# Patient Record
Sex: MALE | URBAN - METROPOLITAN AREA
[De-identification: names, ages, dates, MRNs, and addresses within clinical notes are randomized per-mention and may not be internally consistent; named-entity substitution may affect disease eponyms.]

---

## 2018-10-08 ENCOUNTER — APPOINTMENT (RX ONLY)
Dept: URBAN - METROPOLITAN AREA CLINIC 4 | Facility: CLINIC | Age: 83
Setting detail: DERMATOLOGY
End: 2018-10-08

## 2018-10-08 DIAGNOSIS — L82.1 OTHER SEBORRHEIC KERATOSIS: ICD-10-CM

## 2018-10-08 DIAGNOSIS — L81.4 OTHER MELANIN HYPERPIGMENTATION: ICD-10-CM

## 2018-10-08 DIAGNOSIS — D22 MELANOCYTIC NEVI: ICD-10-CM

## 2018-10-08 DIAGNOSIS — L57.0 ACTINIC KERATOSIS: ICD-10-CM

## 2018-10-08 PROBLEM — D22.5 MELANOCYTIC NEVI OF TRUNK: Status: ACTIVE | Noted: 2018-10-08

## 2018-10-08 PROCEDURE — 17000 DESTRUCT PREMALG LESION: CPT

## 2018-10-08 PROCEDURE — ? LIQUID NITROGEN

## 2018-10-08 PROCEDURE — ? COUNSELING

## 2018-10-08 PROCEDURE — 17003 DESTRUCT PREMALG LES 2-14: CPT

## 2018-10-08 PROCEDURE — 99214 OFFICE O/P EST MOD 30 MIN: CPT | Mod: 25

## 2018-10-08 ASSESSMENT — LOCATION ZONE DERM
LOCATION ZONE: EAR
LOCATION ZONE: NOSE
LOCATION ZONE: FACE
LOCATION ZONE: TRUNK

## 2018-10-08 ASSESSMENT — LOCATION SIMPLE DESCRIPTION DERM
LOCATION SIMPLE: NOSE
LOCATION SIMPLE: RIGHT NOSE
LOCATION SIMPLE: LEFT CHEEK
LOCATION SIMPLE: LEFT NOSE
LOCATION SIMPLE: LEFT UPPER BACK
LOCATION SIMPLE: LEFT EAR

## 2018-10-08 ASSESSMENT — LOCATION DETAILED DESCRIPTION DERM
LOCATION DETAILED: LEFT SOFT TRIANGLE OF THE NOSE
LOCATION DETAILED: LEFT LATERAL UPPER BACK
LOCATION DETAILED: NASAL ROOT
LOCATION DETAILED: LEFT NASAL SIDEWALL
LOCATION DETAILED: LEFT SUPERIOR UPPER BACK
LOCATION DETAILED: LEFT SCAPHA
LOCATION DETAILED: LEFT INFERIOR LATERAL UPPER BACK
LOCATION DETAILED: RIGHT NASAL SIDEWALL
LOCATION DETAILED: LEFT NASAL DORSUM
LOCATION DETAILED: LEFT SUPERIOR CENTRAL MALAR CHEEK

## 2018-10-08 NOTE — PROCEDURE: MIPS QUALITY
Quality 131: Pain Assessment And Follow-Up: Pain assessment NOT documented as being performed, documentation the patient is not eligible for a pain assessment using a standardized tool
Quality 111:Pneumonia Vaccination Status For Older Adults: Pneumococcal Vaccination Previously Received
Detail Level: Detailed
Quality 110: Preventive Care And Screening: Influenza Immunization: Influenza Immunization previously received during influenza season

## 2019-03-21 ENCOUNTER — APPOINTMENT (RX ONLY)
Dept: URBAN - METROPOLITAN AREA CLINIC 4 | Facility: CLINIC | Age: 84
Setting detail: DERMATOLOGY
End: 2019-03-21

## 2019-03-21 DIAGNOSIS — Z87.2 PERSONAL HISTORY OF DISEASES OF THE SKIN AND SUBCUTANEOUS TISSUE: ICD-10-CM

## 2019-03-21 DIAGNOSIS — D22 MELANOCYTIC NEVI: ICD-10-CM

## 2019-03-21 DIAGNOSIS — L81.4 OTHER MELANIN HYPERPIGMENTATION: ICD-10-CM

## 2019-03-21 DIAGNOSIS — L82.1 OTHER SEBORRHEIC KERATOSIS: ICD-10-CM

## 2019-03-21 DIAGNOSIS — L57.0 ACTINIC KERATOSIS: ICD-10-CM

## 2019-03-21 DIAGNOSIS — D18.0 HEMANGIOMA: ICD-10-CM

## 2019-03-21 PROBLEM — D22.5 MELANOCYTIC NEVI OF TRUNK: Status: ACTIVE | Noted: 2019-03-21

## 2019-03-21 PROBLEM — D18.01 HEMANGIOMA OF SKIN AND SUBCUTANEOUS TISSUE: Status: ACTIVE | Noted: 2019-03-21

## 2019-03-21 PROCEDURE — ? DIAGNOSIS COMMENT

## 2019-03-21 PROCEDURE — 17003 DESTRUCT PREMALG LES 2-14: CPT

## 2019-03-21 PROCEDURE — 99213 OFFICE O/P EST LOW 20 MIN: CPT | Mod: 25

## 2019-03-21 PROCEDURE — ? LIQUID NITROGEN

## 2019-03-21 PROCEDURE — ? COUNSELING

## 2019-03-21 PROCEDURE — 17000 DESTRUCT PREMALG LESION: CPT

## 2019-03-21 ASSESSMENT — LOCATION DETAILED DESCRIPTION DERM
LOCATION DETAILED: LEFT SUPERIOR MEDIAL MALAR CHEEK
LOCATION DETAILED: LEFT RADIAL DORSAL HAND
LOCATION DETAILED: NASAL DORSUM
LOCATION DETAILED: LEFT INFERIOR UPPER BACK
LOCATION DETAILED: LEFT POSTERIOR SHOULDER
LOCATION DETAILED: RIGHT ULNAR DORSAL HAND
LOCATION DETAILED: RIGHT INFERIOR MEDIAL UPPER BACK
LOCATION DETAILED: LEFT SCAPHA
LOCATION DETAILED: RIGHT RADIAL DORSAL HAND
LOCATION DETAILED: RIGHT SUPERIOR LATERAL BUCCAL CHEEK
LOCATION DETAILED: INFERIOR THORACIC SPINE
LOCATION DETAILED: LEFT SUPERIOR POSTERIOR HELIX
LOCATION DETAILED: RIGHT POSTERIOR SHOULDER

## 2019-03-21 ASSESSMENT — LOCATION ZONE DERM
LOCATION ZONE: FACE
LOCATION ZONE: TRUNK
LOCATION ZONE: NOSE
LOCATION ZONE: EAR
LOCATION ZONE: ARM
LOCATION ZONE: HAND

## 2019-03-21 ASSESSMENT — LOCATION SIMPLE DESCRIPTION DERM
LOCATION SIMPLE: RIGHT SHOULDER
LOCATION SIMPLE: LEFT HAND
LOCATION SIMPLE: RIGHT CHEEK
LOCATION SIMPLE: RIGHT HAND
LOCATION SIMPLE: LEFT CHEEK
LOCATION SIMPLE: LEFT SHOULDER
LOCATION SIMPLE: LEFT EAR
LOCATION SIMPLE: UPPER BACK
LOCATION SIMPLE: LEFT UPPER BACK
LOCATION SIMPLE: NOSE
LOCATION SIMPLE: RIGHT UPPER BACK

## 2020-12-02 PROBLEM — Z95.2 S/P AVR (AORTIC VALVE REPLACEMENT): Status: ACTIVE | Noted: 2020-12-02

## 2020-12-02 PROBLEM — Z98.890 S/P MVR (MITRAL VALVE REPAIR): Status: ACTIVE | Noted: 2020-12-02

## 2020-12-03 NOTE — H&P (VIEW-ONLY)
Pawan Hernaneds MD, [address] [facility] [address], [address] [place] Phone [phone]   Fax [phone] Date of visit: 12/3/2020 Primary/Requesting provider: Rupert Gunn MD 
 
Chief Complaint Patient presents with  New Patient [facility]  
   
  
HISTORY OF PRESENT ILLNESS Tonya Shi is a 80 y.o. male. HPI Patient is a 80 y.o. male who presents for evaluation of a hernia. This was incidentally noted in the shower about 3 weeks ago. He does recall eating out and vomiting as a result the night prior to identifying the hernia but did not have any pain in the groin. He continues to be asymptomatic, denying pain, nausea, vomiting, alteration of baseline bowel or bladder function. He also denies any concerns on the right side. He is traveling to [facility] in mid January. Interestingly, he has a brother and a son who are PCPs and his brother said to not have surgery, son encouraged him to fix it. He is very active. Medications:  
Current Outpatient Medications Medication Sig  Synthroid 100 mcg tablet Take 100 mcg by mouth daily.  rosuvastatin (CRESTOR) 10 mg tablet Take 10 mg by mouth daily.  cholecalciferol, vitamin D3, (VITAMIN D3 PO) Take  by mouth.  aspirin delayed-release 81 mg tablet Take  by mouth daily.  omeprazole (PRILOSEC) 20 mg capsule Take 20 mg by mouth daily. Prn No current facility-administered medications for this visit. Allergies: No Known Allergies Past History: 
Past Medical History:  
Diagnosis Date  GERD (gastroesophageal reflux disease) Kelly's Esophagus  Hypercholesterolemia  Hypothyroidism Dx approx 2015 Past Surgical History:  
Procedure Laterality Date  HX AORTIC VALVE REPLACEMENT  2005  HX COLONOSCOPY  2017  HX HEMORRHOIDECTOMY Family and Social History: 
Family History Problem Relation Age of Onset  Heart Disease Brother  Diabetes Daughter  Cancer Brother   
     prostate  No Known Problems Brother Social History Socioeconomic History  Marital status:  Spouse name: Not on file  Number of children: Not on file  Years of education: Not on file  Highest education level: Not on file Occupational History  Not on file Social Needs  Financial resource strain: Not on file  Food insecurity Worry: Not on file Inability: Not on file  Transportation needs Medical: Not on file Non-medical: Not on file Tobacco Use  Smoking status: Never Smoker  Smokeless tobacco: Never Used Substance and Sexual Activity  Alcohol use: Yes Comment: 4-5 drinks/week  Drug use: Never  Sexual activity: Not on file Lifestyle  Physical activity Days per week: Not on file Minutes per session: Not on file  Stress: Not on file Relationships  Social connections Talks on phone: Not on file Gets together: Not on file Attends Orthodox service: Not on file Active member of club or organization: Not on file Attends meetings of clubs or organizations: Not on file Relationship status: Not on file  Intimate partner violence Fear of current or ex partner: Not on file Emotionally abused: Not on file Physically abused: Not on file Forced sexual activity: Not on file Other Topics Concern  Not on file Social History Narrative  Not on file Review of Systems Constitutional: Negative. HENT: Negative. Eyes: Negative. Respiratory: Negative. Cardiovascular: Negative. Gastrointestinal: Negative. Genitourinary: Negative. Musculoskeletal: Negative. Skin:  
     Left inguinal hernia Neurological: Negative. Endo/Heme/Allergies: Negative. Psychiatric/Behavioral: Negative. Physical Exam 
Vitals signs and nursing note reviewed. Constitutional: Appearance: He is well-developed. He is not toxic-appearing or diaphoretic. HENT:  
   Head: Normocephalic and atraumatic. Eyes:  
   General: No scleral icterus. Conjunctiva/sclera: Conjunctivae normal.  
   Pupils: Pupils are equal, round, and reactive to light. Neck: Musculoskeletal: Normal range of motion. Thyroid: No thyromegaly. Vascular: No JVD. Trachea: No tracheal deviation. Cardiovascular:  
   Rate and Rhythm: Normal rate and regular rhythm. Heart sounds: No murmur. No friction rub. No gallop. Pulmonary:  
   Effort: Pulmonary effort is normal. No respiratory distress. Breath sounds: Normal breath sounds. No wheezing or rales. Abdominal:  
   General: There is no distension. Palpations: Abdomen is soft. There is no mass. Hernia: A hernia is present. Hernia is present in the left inguinal area. There is no hernia in the right inguinal area. Genitourinary: 
   Penis: Normal.   
   Scrotum/Testes: Normal.  
   Epididymis:  
   Right: Normal.  
   Left: Normal.  
Musculoskeletal:  
   Comments: No gross deformities Skin: 
   General: Skin is warm. Neurological:  
   General: No focal deficit present. Mental Status: He is alert and oriented to person, place, and time. Cranial Nerves: No cranial nerve deficit. Motor: No weakness. Gait: Gait normal.  
Psychiatric:     
   Mood and Affect: Mood normal.     
   Behavior: Behavior normal. Behavior is cooperative. Thought Content: Thought content normal.     
   Judgment: Judgment normal.  
 
 
 
ASSESSMENT and PLAN Encounter Diagnoses Name Primary?  Left inguinal hernia Yes Discussed risk of incarceration/strangulation event in inguinal hernias, at least those identified incidentally which may not apply here, as 1% annual risk at 5 years in recent study randomizing men to surgery or observation. The same study indicated a >60% incidence of elective conversion from observation arm to hernia repair due to symptoms by 10 years. Based on above, and in consideration of his advanced age but overall excellent health and active lifestyle, I recommend repairing the hernia now, so as to avoid any future symptom-related decrease in activity. After discussion, he agrees. Will proceed with left inguinal hernia repair with mesh. Technical details of the procedure are reviewed. Risks reviewed include risks of anesthesia, bleeding, infection, visceral injury, persistent post-operative pain, and hernia recurrence. All questions are answered.

## 2020-12-16 ENCOUNTER — ANESTHESIA EVENT (OUTPATIENT)
Dept: SURGERY | Age: 85
End: 2020-12-16
Payer: MEDICARE

## 2020-12-17 ENCOUNTER — HOSPITAL ENCOUNTER (OUTPATIENT)
Age: 85
Setting detail: OUTPATIENT SURGERY
Discharge: HOME | End: 2020-12-17
Attending: SURGERY | Admitting: SURGERY
Payer: MEDICARE

## 2020-12-17 ENCOUNTER — ANESTHESIA (OUTPATIENT)
Dept: SURGERY | Age: 85
End: 2020-12-17
Payer: MEDICARE

## 2020-12-17 VITALS
HEART RATE: 68 BPM | WEIGHT: 188 LBS | TEMPERATURE: 98 F | DIASTOLIC BLOOD PRESSURE: 62 MMHG | RESPIRATION RATE: 18 BRPM | SYSTOLIC BLOOD PRESSURE: 127 MMHG | OXYGEN SATURATION: 97 % | BODY MASS INDEX: 26.22 KG/M2

## 2020-12-17 DIAGNOSIS — K40.90 LEFT INGUINAL HERNIA: Primary | ICD-10-CM

## 2020-12-17 PROCEDURE — C1781 MESH (IMPLANTABLE): HCPCS | Performed by: SURGERY

## 2020-12-17 PROCEDURE — 77030012411 HC DRN WND CARD -A: Performed by: SURGERY

## 2020-12-17 PROCEDURE — 93306 TTE W/DOPPLER COMPLETE: CPT

## 2020-12-17 PROCEDURE — 49505 PRP I/HERN INIT REDUC >5 YR: CPT | Performed by: SURGERY

## 2020-12-17 PROCEDURE — 77030002996 HC SUT SLK J&J -A: Performed by: SURGERY

## 2020-12-17 PROCEDURE — 76060000033 HC ANESTHESIA 1 TO 1.5 HR: Performed by: SURGERY

## 2020-12-17 PROCEDURE — 76210000020 HC REC RM PH II FIRST 0.5 HR: Performed by: SURGERY

## 2020-12-17 PROCEDURE — 74011250636 HC RX REV CODE- 250/636: Performed by: SURGERY

## 2020-12-17 PROCEDURE — 74011000250 HC RX REV CODE- 250: Performed by: SURGERY

## 2020-12-17 PROCEDURE — 2709999900 HC NON-CHARGEABLE SUPPLY: Performed by: SURGERY

## 2020-12-17 PROCEDURE — 76210000063 HC OR PH I REC FIRST 0.5 HR: Performed by: SURGERY

## 2020-12-17 PROCEDURE — 74011000250 HC RX REV CODE- 250: Performed by: REGISTERED NURSE

## 2020-12-17 PROCEDURE — 77030002986 HC SUT PROL J&J -A: Performed by: SURGERY

## 2020-12-17 PROCEDURE — 76010000138 HC OR TIME 0.5 TO 1 HR: Performed by: SURGERY

## 2020-12-17 PROCEDURE — 77030040922 HC BLNKT HYPOTHRM STRY -A: Performed by: ANESTHESIOLOGY

## 2020-12-17 PROCEDURE — 77030037088 HC TUBE ENDOTRACH ORAL NSL COVD-A: Performed by: ANESTHESIOLOGY

## 2020-12-17 PROCEDURE — 74011250636 HC RX REV CODE- 250/636: Performed by: ANESTHESIOLOGY

## 2020-12-17 PROCEDURE — 74011250637 HC RX REV CODE- 250/637: Performed by: ANESTHESIOLOGY

## 2020-12-17 PROCEDURE — 77030039425 HC BLD LARYNG TRULITE DISP TELE -A: Performed by: ANESTHESIOLOGY

## 2020-12-17 PROCEDURE — 74011250636 HC RX REV CODE- 250/636: Performed by: REGISTERED NURSE

## 2020-12-17 PROCEDURE — 77030040361 HC SLV COMPR DVT MDII -B: Performed by: SURGERY

## 2020-12-17 DEVICE — MESH HERN L W1.6XL1.9IN INGUINAL WHT POLYPR MFIL PLUG PTCH: Type: IMPLANTABLE DEVICE | Site: GROIN | Status: FUNCTIONAL

## 2020-12-17 RX ORDER — HYDROMORPHONE HYDROCHLORIDE 2 MG/ML
0.5 INJECTION, SOLUTION INTRAMUSCULAR; INTRAVENOUS; SUBCUTANEOUS
Status: DISCONTINUED | OUTPATIENT
Start: 2020-12-17 | End: 2020-12-18 | Stop reason: HOSPADM

## 2020-12-17 RX ORDER — LIDOCAINE HYDROCHLORIDE 20 MG/ML
INJECTION, SOLUTION EPIDURAL; INFILTRATION; INTRACAUDAL; PERINEURAL AS NEEDED
Status: DISCONTINUED | OUTPATIENT
Start: 2020-12-17 | End: 2020-12-17 | Stop reason: HOSPADM

## 2020-12-17 RX ORDER — FENTANYL CITRATE 50 UG/ML
INJECTION, SOLUTION INTRAMUSCULAR; INTRAVENOUS AS NEEDED
Status: DISCONTINUED | OUTPATIENT
Start: 2020-12-17 | End: 2020-12-17 | Stop reason: HOSPADM

## 2020-12-17 RX ORDER — ACETAMINOPHEN 500 MG
1000 TABLET ORAL ONCE
Status: COMPLETED | OUTPATIENT
Start: 2020-12-17 | End: 2020-12-17

## 2020-12-17 RX ORDER — OXYCODONE HYDROCHLORIDE 5 MG/1
5 TABLET ORAL
Status: DISCONTINUED | OUTPATIENT
Start: 2020-12-17 | End: 2020-12-18 | Stop reason: HOSPADM

## 2020-12-17 RX ORDER — CEFAZOLIN SODIUM 1 G/3ML
2-3 INJECTION, POWDER, FOR SOLUTION INTRAMUSCULAR; INTRAVENOUS
Status: DISCONTINUED | OUTPATIENT
Start: 2020-12-17 | End: 2020-12-17 | Stop reason: SDUPTHER

## 2020-12-17 RX ORDER — BUPIVACAINE HYDROCHLORIDE AND EPINEPHRINE 2.5; 5 MG/ML; UG/ML
INJECTION, SOLUTION EPIDURAL; INFILTRATION; INTRACAUDAL; PERINEURAL AS NEEDED
Status: DISCONTINUED | OUTPATIENT
Start: 2020-12-17 | End: 2020-12-17 | Stop reason: HOSPADM

## 2020-12-17 RX ORDER — ROCURONIUM BROMIDE 10 MG/ML
INJECTION, SOLUTION INTRAVENOUS AS NEEDED
Status: DISCONTINUED | OUTPATIENT
Start: 2020-12-17 | End: 2020-12-17 | Stop reason: HOSPADM

## 2020-12-17 RX ORDER — ALBUTEROL SULFATE 0.83 MG/ML
2.5 SOLUTION RESPIRATORY (INHALATION) AS NEEDED
Status: DISCONTINUED | OUTPATIENT
Start: 2020-12-17 | End: 2020-12-18 | Stop reason: HOSPADM

## 2020-12-17 RX ORDER — KETOROLAC TROMETHAMINE 30 MG/ML
INJECTION, SOLUTION INTRAMUSCULAR; INTRAVENOUS AS NEEDED
Status: DISCONTINUED | OUTPATIENT
Start: 2020-12-17 | End: 2020-12-17 | Stop reason: HOSPADM

## 2020-12-17 RX ORDER — EPHEDRINE SULFATE/0.9% NACL/PF 50 MG/5 ML
SYRINGE (ML) INTRAVENOUS AS NEEDED
Status: DISCONTINUED | OUTPATIENT
Start: 2020-12-17 | End: 2020-12-17 | Stop reason: HOSPADM

## 2020-12-17 RX ORDER — PROPOFOL 10 MG/ML
INJECTION, EMULSION INTRAVENOUS AS NEEDED
Status: DISCONTINUED | OUTPATIENT
Start: 2020-12-17 | End: 2020-12-17 | Stop reason: HOSPADM

## 2020-12-17 RX ORDER — SODIUM CHLORIDE, SODIUM LACTATE, POTASSIUM CHLORIDE, CALCIUM CHLORIDE 600; 310; 30; 20 MG/100ML; MG/100ML; MG/100ML; MG/100ML
1000 INJECTION, SOLUTION INTRAVENOUS CONTINUOUS
Status: DISCONTINUED | OUTPATIENT
Start: 2020-12-17 | End: 2020-12-17 | Stop reason: HOSPADM

## 2020-12-17 RX ORDER — DEXAMETHASONE SODIUM PHOSPHATE 4 MG/ML
INJECTION, SOLUTION INTRA-ARTICULAR; INTRALESIONAL; INTRAMUSCULAR; INTRAVENOUS; SOFT TISSUE AS NEEDED
Status: DISCONTINUED | OUTPATIENT
Start: 2020-12-17 | End: 2020-12-17 | Stop reason: HOSPADM

## 2020-12-17 RX ORDER — LIDOCAINE HYDROCHLORIDE 10 MG/ML
0.1 INJECTION INFILTRATION; PERINEURAL AS NEEDED
Status: DISCONTINUED | OUTPATIENT
Start: 2020-12-17 | End: 2020-12-17 | Stop reason: HOSPADM

## 2020-12-17 RX ORDER — CEFAZOLIN SODIUM/WATER 2 G/20 ML
2 SYRINGE (ML) INTRAVENOUS
Status: COMPLETED | OUTPATIENT
Start: 2020-12-17 | End: 2020-12-17

## 2020-12-17 RX ORDER — ONDANSETRON 2 MG/ML
4 INJECTION INTRAMUSCULAR; INTRAVENOUS
Status: DISCONTINUED | OUTPATIENT
Start: 2020-12-17 | End: 2020-12-18 | Stop reason: HOSPADM

## 2020-12-17 RX ORDER — HYDROCODONE BITARTRATE AND ACETAMINOPHEN 5; 325 MG/1; MG/1
TABLET ORAL
Qty: 20 TAB | Refills: 0 | Status: SHIPPED | OUTPATIENT
Start: 2020-12-17 | End: 2020-12-24

## 2020-12-17 RX ORDER — ONDANSETRON 2 MG/ML
INJECTION INTRAMUSCULAR; INTRAVENOUS AS NEEDED
Status: DISCONTINUED | OUTPATIENT
Start: 2020-12-17 | End: 2020-12-17 | Stop reason: HOSPADM

## 2020-12-17 RX ORDER — MIDAZOLAM HYDROCHLORIDE 1 MG/ML
2 INJECTION, SOLUTION INTRAMUSCULAR; INTRAVENOUS
Status: DISCONTINUED | OUTPATIENT
Start: 2020-12-17 | End: 2020-12-17 | Stop reason: HOSPADM

## 2020-12-17 RX ADMIN — Medication 10 MG: at 15:35

## 2020-12-17 RX ADMIN — SUGAMMADEX 200 MG: 100 INJECTION, SOLUTION INTRAVENOUS at 16:20

## 2020-12-17 RX ADMIN — ROCURONIUM BROMIDE 30 MG: 10 INJECTION, SOLUTION INTRAVENOUS at 15:31

## 2020-12-17 RX ADMIN — DEXAMETHASONE SODIUM PHOSPHATE 4 MG: 4 INJECTION, SOLUTION INTRAMUSCULAR; INTRAVENOUS at 15:53

## 2020-12-17 RX ADMIN — Medication 5 MG: at 16:11

## 2020-12-17 RX ADMIN — FENTANYL CITRATE 50 MCG: 50 INJECTION INTRAMUSCULAR; INTRAVENOUS at 15:31

## 2020-12-17 RX ADMIN — ONDANSETRON 4 MG: 2 INJECTION INTRAMUSCULAR; INTRAVENOUS at 15:53

## 2020-12-17 RX ADMIN — PROPOFOL 100 MG: 10 INJECTION, EMULSION INTRAVENOUS at 15:31

## 2020-12-17 RX ADMIN — LIDOCAINE HYDROCHLORIDE 100 MG: 20 INJECTION, SOLUTION EPIDURAL; INFILTRATION; INTRACAUDAL; PERINEURAL at 15:31

## 2020-12-17 RX ADMIN — ACETAMINOPHEN 1000 MG: 500 TABLET ORAL at 11:30

## 2020-12-17 RX ADMIN — Medication 5 MG: at 16:01

## 2020-12-17 RX ADMIN — Medication 2 G: at 15:36

## 2020-12-17 RX ADMIN — SODIUM CHLORIDE, SODIUM LACTATE, POTASSIUM CHLORIDE, AND CALCIUM CHLORIDE 1000 ML: 600; 310; 30; 20 INJECTION, SOLUTION INTRAVENOUS at 11:31

## 2020-12-17 RX ADMIN — KETOROLAC TROMETHAMINE 15 MG: 30 INJECTION, SOLUTION INTRAMUSCULAR at 16:26

## 2020-12-17 NOTE — ADDENDUM NOTE
Addendum  created 12/17/20 1802 by Leo Torres CRNA    Intraprocedure Meds edited, Orders acknowledged in Narrator

## 2020-12-17 NOTE — ANESTHESIA POSTPROCEDURE EVALUATION
Procedure(s):  LEFT OPEN HERNIA INGUINAL REPAIR WITH MESH. general    Anesthesia Post Evaluation      Multimodal analgesia: multimodal analgesia used between 6 hours prior to anesthesia start to PACU discharge  Patient location during evaluation: bedside  Patient participation: complete - patient participated  Level of consciousness: awake and responsive to light touch  Pain management: adequate  Airway patency: patent  Anesthetic complications: no  Cardiovascular status: acceptable, hemodynamically stable, blood pressure returned to baseline and stable  Respiratory status: acceptable, unassisted, spontaneous ventilation and nonlabored ventilation  Hydration status: acceptable  Post anesthesia nausea and vomiting:  controlled      INITIAL Post-op Vital signs:   Vitals Value Taken Time   /59 12/17/20 1634   Temp 36.3 °C (97.3 °F) 12/17/20 1631   Pulse 69 12/17/20 1638   Resp 16 12/17/20 1634   SpO2 100 % 12/17/20 1638   Vitals shown include unvalidated device data.

## 2020-12-17 NOTE — DISCHARGE INSTRUCTIONS
Lorene Friedman M.D.  [phone]    Instructions following Hernia Repair:    ACTIVITY:   Try to take a few short walks with help around the house later today. It is very important to take short walks to avoid blood clots and pneumonia.  You may be light-headed or sleepy from anesthesia, so be careful going up and down stairs. Avoid any activity that involves lifting/pushing more than 30 pounds until your followup appointment  DIET:   Drink only clear, non-carbonated liquids when you first get home (sugar-free if you are diabetic), such as Gatorade, chicken broth, etc.   Later  you may resume a more normal diet, depending on how you feel    PAIN:   You will be given a prescription for pain medication.  Try to take the pain medication with food, even a few crackers.  You may also use Tylenol, Motrin, Advil, or Aleve instead of the prescription pain medication. Do no take Tylenol and the prescription pain medication within 6 hours of each other.  URINARY RETENTION: If you are unable to empty your bladder within 6 hours after returning home, please go to your nearest Emergency Department or Urgent Care for urinary catheterization. WOUND CARE:   You may shower the day after surgery, unless instructed otherwise.  It is not uncommon for the incisions to ooze or drain blood-tinged fluid. You may remove the clear dressing on the fifth postoperative day.  Incisions will sometimes develop redness around them, up to the size of a quarter, as well as a hard lumpy feel. If this redness continues to get larger, please call the office. FOLLOW UP:   Your follow-up appointment is usually made when your surgery is arranged. Please call the office if you are not sure of this appointment. CALL THE DOCTOR IF:   You have a temperature higher than 101.5° Fahrenheit for more than 6 hours.  You have severe nausea or vomiting.  You develop increasing redness or infection at the incision.      Continue home medications as previously prescribed. CALL YOUR DOCTOR IF     Call your doctor if pain is NOT relieved by medication.   Excessive bleeding that does not stop after holding pressure over the area  · Temperature of 101 degrees F or above  · Excessive redness, swelling or bruising, and/ or green or yellow, smelly discharge from incision      TYPICAL SIDE EFFECTS OF PAIN MEDICATION:     Constipation: Drink lots of fluids. Over the counter stool softener if needed.    Nausea: Take pain medication with food. Call your doctor with persistent nausea. ACTIVITY  · As tolerated and as directed by your doctor. · Bathe or shower as directed by your doctor. DIET  · Day of surgery: Clear liquids until no nausea or vomiting; small portion, light diet [place] foods (ex: baked chicken, plain rice, grits, scrambled eggs, toast). Nothing greasy, fried or spicy today. · Advance to regular diet on second day, unless your doctor orders otherwise. · If nausea and vomiting continues, call your doctor. PAIN  · Take pain medication as directed by your doctor. · DO NOT take aspirin or blood thinners unless directed by your doctor. AFTER ANESTHESIA   · For the first 24 hours: DO NOT Drive, Drink alcoholic beverages, or Make important decisions. · Be aware of dizziness following anesthesia and while taking pain medication. DISCHARGE SUMMARY from Nurse    PATIENT INSTRUCTIONS:    After general anesthesia or intravenous sedation, for 24 hours or while taking prescription Narcotics:  · Limit your activities  · Do not drive and operate hazardous machinery  · Do not make important personal or business decisions  · Do  not drink alcoholic beverages  · If you have not urinated within 8 hours after discharge, please contact your surgeon on call. *  Please give a list of your current medications to your Primary Care Provider.     *  Please update this list whenever your medications are discontinued, doses are      changed, or new medications (including over-the-counter products) are added. *  Please carry medication information at all times in case of emergency situations. Preventing Infection at Home  We care about preventing infection and avoiding the spread of germs - not only when you are in the hospital but also when you return home. When you return home from the hospital, its important to take the following steps to help prevent infection and avoid spreading germs that could infect you and others. Ask everyone in your home to follow these guidelines, too. Clean Your Hands  · Clean your hands whenever your hands are visibly dirty, before you eat, before or after touching your mouth, nose or eyes, and before preparing food. Clean them after contact with body fluids, using the restroom, touching animals or changing diapers. · When washing hands, wet them with warm water and work up a lather. Rub hands for at least 15 seconds, then rinse them and pat them dry with a clean towel or paper towel. · When using hand sanitizers, it should take about 15 seconds to rub your hands dry. If not, you probably didnt apply enough . Cover Your Sneeze or Cough  Germs are released into the air whenever you sneeze or cough. To prevent the spread of infection:  · Turn away from other people before coughing or sneezing. · Cover your mouth or nose with a tissue when you cough or sneeze. Put the tissue in the trash. · If you dont have a tissue, cough or sneeze into your upper sleeve, not your hands. · Always clean your hands after coughing or sneezing. Care for Wounds  Your skin is your bodys first line of defense against germs, but an open wound leaves an easy way for germs to enter your body. To prevent infection:  · Clean your hands before and after changing wound dressings, and wear gloves to change dressings if recommended by your doctor.   · Take special care with IV lines or other devices inserted into the body. If you must touch them, clean your hands first.  · Follow any specific instructions from your doctor to care for your wounds. Contact your doctor if you experience any signs of infection, such as fever or increased redness at the surgical or wound site. Keep a Clean Home  · Clean or wipe commonly touched hard surfaces like door handles, sinks, tabletops, phones and TV remotes. · Use products labeled disinfectant to kill harmful bacteria and viruses. · Use a clean cloth or paper towel to clean and dry surfaces. Wiping surfaces with a dirty dishcloth, sponge or towel will only spread germs. · Never share toothbrushes, rosas, drinking glasses, utensils, razor blades, face cloths or bath towels to avoid spreading germs. · Be sure that the linens that you sleep on are clean. · Keep pets away from wounds and wash your hands after touching pets, their toys or bedding. We care about you and your health. Remember, preventing infections is a team effort between you, your family, friends and health care providers. These are general instructions for a healthy lifestyle:    No smoking/ No tobacco products/ Avoid exposure to second hand smoke    Surgeon General's Warning:  Quitting smoking now greatly reduces serious risk to your health. Obesity, smoking, and sedentary lifestyle greatly increases your risk for illness    A healthy diet, regular physical exercise & weight monitoring are important for maintaining a healthy lifestyle    You may be retaining fluid if you have a history of heart failure or if you experience any of the following symptoms:  Weight gain of 3 pounds or more overnight or 5 pounds in a week, increased swelling in our hands or feet or shortness of breath while lying flat in bed. Please call your doctor as soon as you notice any of these symptoms; do not wait until your next office visit.     Recognize signs and symptoms of STROKE:    F-face looks uneven    A-arms unable to move or move unevenly    S-speech slurred or non-existent    T-time-call 911 as soon as signs and symptoms begin-DO NOT go       Back to bed or wait to see if you get better-TIME IS BRAIN. Advance Care Planning  People with COVID-19 may have no symptoms, mild symptoms, such as fever, cough, and shortness of breath or they may have more severe illness, developing severe and fatal pneumonia. As a result, Advance Care Planning with attention to naming a health care decision maker (someone you trust to make healthcare decisions for you if you could not speak for yourself) and sharing other health care preferences is important BEFORE a possible health crisis. Please contact your Primary Care Provider to discuss Advance Care Planning. Preventing the Spread of Coronavirus Disease 2019 in Homes and Residential Communities  For the most recent information go to [email or web address]    Prevention steps for People with confirmed or suspected COVID-19 (including persons under investigation) who do not need to be hospitalized  and   People with confirmed COVID-19 who were hospitalized and determined to be medically stable to go home    Your healthcare provider and public health staff will evaluate whether you can be cared for at home. If it is determined that you do not need to be hospitalized and can be isolated at home, you will be monitored by staff from your local or state health department. You should follow the prevention steps below until a healthcare provider or local or state health department says you can return to your normal activities. Stay home except to get medical care  People who are mildly ill with COVID-19 are able to isolate at home during their illness. You should restrict activities outside your home, except for getting medical care. Do not go to work, school, or public areas.  Avoid using public transportation, ride-sharing, or taxis. Separate yourself from other people and animals in your home  People: As much as possible, you should stay in a specific room and away from other people in your home. Also, you should use a separate bathroom, if available. Animals: You should restrict contact with pets and other animals while you are sick with COVID-19, just like you would around other people. Although there have not been reports of pets or other animals becoming sick with COVID-19, it is still recommended that people sick with COVID-19 limit contact with animals until more information is known about the virus. When possible, have another member of your household care for your animals while you are sick. If you are sick with COVID-19, avoid contact with your pet, including petting, snuggling, being kissed or licked, and sharing food. If you must care for your pet or be around animals while you are sick, wash your hands before and after you interact with pets and wear a facemask. Call ahead before visiting your doctor  If you have a medical appointment, call the healthcare provider and tell them that you have or may have COVID-19. This will help the healthcare providers office take steps to keep other people from getting infected or exposed. Wear a facemask  You should wear a facemask when you are around other people (e.g., sharing a room or vehicle) or pets and before you enter a healthcare providers office. If you are not able to wear a facemask (for example, because it causes trouble breathing), then people who live with you should not stay in the same room with you, or they should wear a facemask if they enter your room. Cover your coughs and sneezes  Cover your mouth and nose with a tissue when you cough or sneeze. Throw used tissues in a lined trash can.  Immediately wash your hands with soap and water for at least 20 seconds or, if soap and water are not available, clean your hands with an alcohol-based hand  that contains at least 60% alcohol. Clean your hands often  Wash your hands often with soap and water for at least 20 seconds, especially after blowing your nose, coughing, or sneezing; going to the bathroom; and before eating or preparing food. If soap and water are not readily available, use an alcohol-based hand  with at least 60% alcohol, covering all surfaces of your hands and rubbing them together until they feel dry. Soap and water are the best option if hands are visibly dirty. Avoid touching your eyes, nose, and mouth with unwashed hands. Avoid sharing personal household items  You should not share dishes, drinking glasses, cups, eating utensils, towels, or bedding with other people or pets in your home. After using these items, they should be washed thoroughly with soap and water. Clean all high-touch surfaces everyday  High touch surfaces include counters, tabletops, doorknobs, bathroom fixtures, toilets, phones, keyboards, tablets, and bedside tables. Also, clean any surfaces that may have blood, stool, or body fluids on them. Use a household cleaning spray or wipe, according to the label instructions. Labels contain instructions for safe and effective use of the cleaning product including precautions you should take when applying the product, such as wearing gloves and making sure you have good ventilation during use of the product. Monitor your symptoms  Seek prompt medical attention if your illness is worsening (e.g., difficulty breathing). Before seeking care, call your healthcare provider and tell them that you have, or are being evaluated for, COVID-19. Put on a facemask before you enter the facility. These steps will help the healthcare providers office to keep other people in the office or waiting room from getting infected or exposed. Ask your healthcare provider to call the local or state health department.  Persons who are placed under active monitoring or facilitated self-monitoring should follow instructions provided by their local health department or occupational health professionals, as appropriate. When working with your local health department check their available hours. If you have a medical emergency and need to call 911, notify the dispatch personnel that you have, or are being evaluated for COVID-19. If possible, put on a facemask before emergency medical services arrive. Discontinuing home isolation  Patients with confirmed COVID-19 should remain under home isolation precautions until the risk of secondary transmission to others is thought to be low. The decision to discontinue home isolation precautions should be made on a case-by-case basis, in consultation with healthcare providers and state and local health departments.

## 2020-12-17 NOTE — INTERVAL H&P NOTE
Update History & Physical 
 
The Patient's History and Physical was reviewed with the patient and I examined the patient. There was no change. The surgical site was confirmed by the patient and me. Plan:  The risk, benefits, expected outcome, and alternative to the recommended procedure have been discussed with the patient. Patient understands and wants to proceed with the procedure.  
 
Electronically signed by Nicky Johnson MD on 12/17/2020 at 2:56 PM

## 2020-12-17 NOTE — OP NOTES
Operative Report    Patient: Maira Rivero MRN: [id number]     YOB: 1932  Age: 80 y.o. Sex: male       Date of Surgery: 12/17/2020     Preoperative Diagnosis: Left inguinal hernia [K40.90]     Postoperative Diagnosis: Left inguinal hernia [K40.90]     Procedure:  left Direct Inguinal Hernia Repair with PerFix Plug     Anesthesia: General     Complications: none    Indications:  As outlined in History and Physical.    Procedure Details   Informed consent was obtained and the patient was brought to the operating room and placed on the table in a supine position. After successful induction of anesthesia, the groin was prepped and draped in the standard fashion. An incision was made in the suprainguinal region and was carried down through the subcutaneous tissues with cautery to the external oblique aponeurosis which was incised parallel to its fibers including opening through the external ring. one nerve(s) was/were identified on the floor of the canal and were avoided throughout the procedure. The cord structures were mobilized at the level of the pubic tubercle and isolated with a penrose drain. The cord was then dissected in its proximal third and an indirect sac was not identified. A small cord lipoma was excised, and a nominally dilated internal ring was noted. A  Large-based direct defect was noted. The direct sac was dissected from the surrounding structures with cautery and it was incised circumferentially at its base the enter the preperitoneal space which was further developed with insertion of a sponge. A large  PerFix plug was placed into the preperitoneal space and the inner petals of the plug were sutured to the surrounding conjoined tendon tissue with 2-0 Prolene.     Once this was accomplished, the patch was placed on the floor of the inguinal canal and sutured in place with 2-0 Prolene to the pubic tubercle medially, the inguinal ligament inferiorly, the rectus fascia superiorly, and laterally the upper leaf was crossed over the lower leaf and sutured to the inguinal ligament to provide a valve-like reconstruction of the internal ring. The cord was then returned to its normal anatomic position. The wound was irrigated, made hemostatic as necessary with cautery, and infiltrated with local.  The external oblique was then reapproximated with 2-0 vicryl to recreate the external ring, Cristopher's fascia was closed with 3-0 Vicryl  and the skin with subcuticular 4-0 Vicryl. Steri-Strips, telfa, and a tegaderm dressing was applied. The patient tolerated the procedure well and was awakened from anesthesia and taken to recovery in satisfactory condition. Sponge, needle, and instrument counts were correct as reported to me.     Paris Cárdenas MD  December 17, 2020

## 2020-12-17 NOTE — ANESTHESIA PREPROCEDURE EVALUATION
Relevant Problems   ENDOCRINE   (+) Hypothyroidism       Anesthetic History   No history of anesthetic complications            Review of Systems / Medical History  Patient summary reviewed and pertinent labs reviewed    Pulmonary  Within defined limits                 Neuro/Psych   Within defined limits           Cardiovascular      Valvular problems/murmurs (s/p AVR and MVR)        Hyperlipidemia    Exercise tolerance: <4 METS     GI/Hepatic/Renal     GERD           Endo/Other      Hypothyroidism       Other Findings              Physical Exam    Airway  Mallampati: II  TM Distance: 4 - 6 cm  Neck ROM: normal range of motion   Mouth opening: Normal     Cardiovascular    Rhythm: regular  Rate: normal      Pertinent negatives: No murmur   Dental  No notable dental hx       Pulmonary  Breath sounds clear to auscultation               Abdominal         Other Findings            Anesthetic Plan    ASA: 3  Anesthesia type: general          Induction: Intravenous  Anesthetic plan and risks discussed with: Patient

## 2022-03-18 PROBLEM — Z95.2 S/P AVR (AORTIC VALVE REPLACEMENT): Status: ACTIVE | Noted: 2020-12-02

## 2022-03-19 PROBLEM — Z98.890 S/P MVR (MITRAL VALVE REPAIR): Status: ACTIVE | Noted: 2020-12-02

## 2022-07-05 ENCOUNTER — OFFICE VISIT (OUTPATIENT)
Dept: CARDIOLOGY CLINIC | Age: 87
End: 2022-07-05
Payer: MEDICARE

## 2022-07-05 VITALS
WEIGHT: 185.8 LBS | DIASTOLIC BLOOD PRESSURE: 70 MMHG | HEIGHT: 71 IN | SYSTOLIC BLOOD PRESSURE: 140 MMHG | HEART RATE: 68 BPM | BODY MASS INDEX: 26.01 KG/M2

## 2022-07-05 DIAGNOSIS — Z98.890 S/P MVR (MITRAL VALVE REPAIR): Primary | ICD-10-CM

## 2022-07-05 DIAGNOSIS — Z95.2 S/P AVR (AORTIC VALVE REPLACEMENT): ICD-10-CM

## 2022-07-05 DIAGNOSIS — E78.00 HYPERCHOLESTEROLEMIA: ICD-10-CM

## 2022-07-05 PROCEDURE — G8417 CALC BMI ABV UP PARAM F/U: HCPCS | Performed by: INTERNAL MEDICINE

## 2022-07-05 PROCEDURE — 4004F PT TOBACCO SCREEN RCVD TLK: CPT | Performed by: INTERNAL MEDICINE

## 2022-07-05 PROCEDURE — 1123F ACP DISCUSS/DSCN MKR DOCD: CPT | Performed by: INTERNAL MEDICINE

## 2022-07-05 PROCEDURE — G8428 CUR MEDS NOT DOCUMENT: HCPCS | Performed by: INTERNAL MEDICINE

## 2022-07-05 PROCEDURE — 99214 OFFICE O/P EST MOD 30 MIN: CPT | Performed by: INTERNAL MEDICINE

## 2022-07-05 ASSESSMENT — ENCOUNTER SYMPTOMS: SHORTNESS OF BREATH: 0

## 2022-07-05 NOTE — PROGRESS NOTES
[address], [address] [facility], [address] [place] [address]  PHONE: [phone]    Giovani Lemons  4/19/1932      SUBJECTIVE:   Moni Stauffer is a [address]o. male seen for a follow up visit regarding the following:     Chief Complaint   Patient presents with    Results     Echo S/P AVR       HPI:    71-year-old male comes back for follow-up with history of a bioprosthetic aortic valve replacement mitral valve repair with mitral ring from around 2005 [place]. He is really doing very well and stays active plays little golf. He has no complaints of shortness of breath or chest discomfort      Past Medical History, Past Surgical History, Family history, Social History, and Medications were all reviewed with the patient today and updated as necessary. Not on File  Past Medical History:   Diagnosis Date    CAD (coronary artery disease)     aortic  replaced 2005--AND MITRAL VALVE REPAIRED---no mi/ no stents--last echo 2018 per dr Ahmet Moreno note--- followed by dr. Nai Crisostomo Cardiac murmur     \" significant\" per dr Ahmet Moreno note in cc dated 12/2/20-- plans for echo 1/5/20    GERD (gastroesophageal reflux disease)     Jones's Esophagus    Hernia, inguinal, left     Hypercholesterolemia     Hypothyroidism Dx approx 2015     Past Surgical History:   Procedure Laterality Date    COLONOSCOPY  2017    HEMORRHOID SURGERY  1957    NH CARDIAC SURG PROCEDURE UNLIST  2005    aortic valve  replaced and mitral  repaired     Family History   Problem Relation Age of Onset    Heart Disease Brother     Diabetes Daughter     No Known Problems Brother     Cancer Brother         prostate      Social History     Tobacco Use    Smoking status: Never Smoker    Smokeless tobacco: Never Used   Substance Use Topics    Alcohol use: Yes     Alcohol/week: 5.0 standard drinks       ROS:    Review of Systems   Constitutional: Negative for decreased appetite and weight loss. Cardiovascular: Negative for chest pain, dyspnea on exertion, leg swelling, near-syncope and palpitations. Respiratory: Negative for shortness of breath. Hematologic/Lymphatic: Negative for bleeding problem. Musculoskeletal: Negative for falls. PHYSICAL EXAM:    BP (!) 140/70   Pulse 68   Ht 5' 11\" (1.803 m)   Wt 185 lb 12.8 oz (84.3 kg)   BMI 25.91 kg/m²        Wt Readings from Last 3 Encounters:   07/05/22 185 lb 12.8 oz (84.3 kg)   06/27/22 195 lb (88.5 kg)   03/01/22 195 lb (88.5 kg)     BP Readings from Last 3 Encounters:   07/05/22 (!) 140/70   06/27/22 132/63   03/01/22 (!) 164/80         Physical Exam  Constitutional:       General: He is not in acute distress. Cardiovascular:      Rate and Rhythm: Normal rate. Pulses: Normal pulses. Heart sounds: Murmur heard. Early systolic murmur is present with a grade of 2/6. Musculoskeletal:         General: No swelling. Skin:     General: Skin is warm. Neurological:      General: No focal deficit present. Mental Status: He is alert. Medical problems and test results were reviewed with the patient today. No results found for any visits on 07/05/22. Lab Results   Component Value Date/Time     02/07/2022 11:24 AM    K 4.6 02/07/2022 11:24 AM     02/07/2022 11:24 AM    CO2 24 02/07/2022 11:24 AM    BUN 22 02/07/2022 11:24 AM    GFRAA 73 02/07/2022 11:24 AM     Follow-up echo shows normal ventricular systolic function. There is mitral valve thickening and a previous ring in place with very mild insufficiency. Bioprosthetic aortic aortic valve is stable peak gradient 50 that is unchanged index is 0.4 suggestive more in the moderate range of stenosis and mild aortic insufficiency      ASSESSMENT and PLAN    Mihir Rubio was seen today for results.     Diagnoses and all orders for this visit:    S/P MVR (mitral valve repair) stable repair mild insufficiency noted    S/P AVR (aortic valve replacement) that valve is holding very well and still moderate stenosis and mild insufficiency. Asymptomatic. We will continue to follow that for now he does not need surgery at this point his valve index was 0.4    Hypercholesterolemia patient has been stable on Crestor 10        [unfilled]      No follow-up provider specified.     Ramya Mejia MD  7/5/2022  11:34 AM

## 2022-08-08 ENCOUNTER — OFFICE VISIT (OUTPATIENT)
Dept: FAMILY MEDICINE CLINIC | Facility: CLINIC | Age: 87
End: 2022-08-08
Payer: MEDICARE

## 2022-08-08 VITALS
WEIGHT: 180 LBS | SYSTOLIC BLOOD PRESSURE: 134 MMHG | HEART RATE: 74 BPM | BODY MASS INDEX: 25.1 KG/M2 | OXYGEN SATURATION: 98 % | DIASTOLIC BLOOD PRESSURE: 82 MMHG | TEMPERATURE: 98.6 F

## 2022-08-08 DIAGNOSIS — K21.9 GASTROESOPHAGEAL REFLUX DISEASE WITHOUT ESOPHAGITIS: ICD-10-CM

## 2022-08-08 DIAGNOSIS — E03.9 ACQUIRED HYPOTHYROIDISM: Primary | ICD-10-CM

## 2022-08-08 DIAGNOSIS — E78.5 HYPERLIPIDEMIA, UNSPECIFIED HYPERLIPIDEMIA TYPE: ICD-10-CM

## 2022-08-08 DIAGNOSIS — M25.511 CHRONIC RIGHT SHOULDER PAIN: ICD-10-CM

## 2022-08-08 DIAGNOSIS — G89.29 CHRONIC RIGHT SHOULDER PAIN: ICD-10-CM

## 2022-08-08 PROCEDURE — 99214 OFFICE O/P EST MOD 30 MIN: CPT | Performed by: FAMILY MEDICINE

## 2022-08-08 PROCEDURE — G8417 CALC BMI ABV UP PARAM F/U: HCPCS | Performed by: FAMILY MEDICINE

## 2022-08-08 PROCEDURE — 4004F PT TOBACCO SCREEN RCVD TLK: CPT | Performed by: FAMILY MEDICINE

## 2022-08-08 PROCEDURE — G8427 DOCREV CUR MEDS BY ELIG CLIN: HCPCS | Performed by: FAMILY MEDICINE

## 2022-08-08 PROCEDURE — 1123F ACP DISCUSS/DSCN MKR DOCD: CPT | Performed by: FAMILY MEDICINE

## 2022-08-08 ASSESSMENT — PATIENT HEALTH QUESTIONNAIRE - PHQ9
SUM OF ALL RESPONSES TO PHQ9 QUESTIONS 1 & 2: 0
2. FEELING DOWN, DEPRESSED OR HOPELESS: 0
SUM OF ALL RESPONSES TO PHQ QUESTIONS 1-9: 0
SUM OF ALL RESPONSES TO PHQ QUESTIONS 1-9: 0
1. LITTLE INTEREST OR PLEASURE IN DOING THINGS: 0
SUM OF ALL RESPONSES TO PHQ QUESTIONS 1-9: 0
SUM OF ALL RESPONSES TO PHQ QUESTIONS 1-9: 0

## 2022-08-08 ASSESSMENT — ENCOUNTER SYMPTOMS
GASTROINTESTINAL NEGATIVE: 1
EYES NEGATIVE: 1
RESPIRATORY NEGATIVE: 1

## 2022-08-08 NOTE — PROGRESS NOTES
HISTORY OF PRESENT ILLNESS    Tom Vincent is a 80 y.o. male. HPI  Chief Complaint   Patient presents with    6 Month Follow-Up    Shoulder Pain     Right      See above. Overall doing well. Able to do everything he likes including playing golf. Feels like thyroid supplement is working well. Denies excessive fatigue. No constipation. Denies dry skin. No side effects. Right shoulder has bother him for years. Generally tolerable. A few months ago has increased pain but only when trying to sleep. During the day has progressive stiffness with decreased ROM. No numbness or weakness. GERD symptoms are controlled. No side effects. No difficulty swallowing. No night time symptoms. BMs are normal.  Tolerating statin well. Current Outpatient Medications on File Prior to Visit   Medication Sig Dispense Refill    aspirin 81 MG EC tablet Take by mouth daily      levothyroxine (SYNTHROID) 125 MCG tablet Take 125 mcg by mouth every morning (before breakfast)      omeprazole (PRILOSEC) 40 MG delayed release capsule Take 40 mg by mouth daily      rosuvastatin (CRESTOR) 10 MG tablet Take 10 mg by mouth daily       No current facility-administered medications on file prior to visit. Past Medical History:   Diagnosis Date    CAD (coronary artery disease)     aortic  replaced 2005--AND MITRAL VALVE REPAIRED---no mi/ no stents--last echo 2018 per dr Otilio Miramontes note--- followed by dr. Otilio Miramontes    Cardiac murmur     \" significant\" per dr Otilio Miramontes note in cc dated 12/2/20-- plans for echo 1/5/20    GERD (gastroesophageal reflux disease)     Jones's Esophagus    Hernia, inguinal, left     Hypercholesterolemia     Hypothyroidism Dx approx 2015       Review of Systems   Constitutional: Negative. HENT: Negative. Eyes: Negative. Respiratory: Negative. Cardiovascular: Negative. Gastrointestinal: Negative. Genitourinary: Negative. Musculoskeletal:  Positive for arthralgias (right shoulder). Neurological: Negative. Psychiatric/Behavioral: Negative. Blood pressure 134/82, pulse 74, temperature 98.6 °F (37 °C), temperature source Temporal, weight 180 lb (81.6 kg), SpO2 98 %. Physical Exam  Vitals and nursing note reviewed. Constitutional:       Appearance: Normal appearance. HENT:      Mouth/Throat:      Mouth: Mucous membranes are moist.      Pharynx: Oropharynx is clear. Eyes:      Conjunctiva/sclera: Conjunctivae normal.   Neck:      Vascular: No carotid bruit. Cardiovascular:      Rate and Rhythm: Normal rate and regular rhythm. Heart sounds: Normal heart sounds. Pulmonary:      Breath sounds: Normal breath sounds. Musculoskeletal:         General: No swelling. Cervical back: Neck supple. Comments: Right shoulder has no point tenderness. Full passive ROM with mild crepitus. Decreased anterior, lateral and posterior extension due to stiffness and soreness. Neurovascular is intact. Lymphadenopathy:      Cervical: No cervical adenopathy. Psychiatric:         Mood and Affect: Mood normal.        ASSESSMENT and PLAN    Diogenes Love was seen today for 6 month follow-up and shoulder pain. Diagnoses and all orders for this visit:    Acquired hypothyroidism    Chronic right shoulder pain    Gastroesophageal reflux disease without esophagitis    Hyperlipidemia, unspecified hyperlipidemia type   Discussed history and medications with patient. Continue current measures. Follow up if side effects occur or if new symptoms develop. Notify lab results  Referral to PT to right shoulder. Return in about 6 months (around 2/8/2023).     Renee Smith MD

## 2022-08-11 ENCOUNTER — HOSPITAL ENCOUNTER (OUTPATIENT)
Dept: PHYSICAL THERAPY | Age: 87
Setting detail: RECURRING SERIES
Discharge: HOME | End: 2022-08-14
Payer: MEDICARE

## 2022-08-11 PROCEDURE — 97161 PT EVAL LOW COMPLEX 20 MIN: CPT

## 2022-08-11 ASSESSMENT — PAIN SCALES - GENERAL: PAINLEVEL_OUTOF10: 3

## 2022-08-11 NOTE — PROGRESS NOTES
Elidia Lemons  : 1932  Primary: [facility] Medicare Complete  Secondary:  Samantha Chamorro @ [address] [place] [id number]  Phone: [phone]  Fax: [phone] 2x/week for 60 days  Plan of Care/Certification Expiration Date: 10/10/22    PT Visit Info:  No data recorded   Visit Count:  1   OUTPATIENT PHYSICAL THERAPY:OP NOTE TYPE: Treatment Note 2022       Episode  }Appt Desk             Treatment Diagnosis:  Pain in Right Shoulder (M25.511)  Generalized Muscle Weakness (M62.81); Strain of R muscle and tendon of the rotator cuff (P70.811N)  Medical/Referring Diagnosis:  Pain in right shoulder [M25.511]  Other chronic pain [G89.29]  Referring Physician:  Henrietta Jin MD MD Orders:  PT Eval and Treat   Date of Onset:    Allergies:   Patient has no known allergies. Restrictions/Precautions:  None  Interventions Planned (Treatment may consist of any combination of the following):    Current Treatment Recommendations: Strengthening; ROM; Neuromuscular re-education; Manual Therapy - Soft Tissue Mobilization; Manual Therapy - Joint Manipulation; Modalities; Integrated dry needling; Therapeutic activities   Subjective Comments: R shoulder pain     Initial:}    3/10Post Session:       2/10  Medications Last Reviewed:  2022  Updated Objective Findings:  See evaluation note from today  Treatment   THERAPEUTIC EXERCISE: (5 minutes):    Exercises per grid below to improve mobility, strength, and coordination. Required minimal visual, verbal, manual, and tactile cues to promote proper body alignment, promote proper body posture, and promote proper body mechanics. Progressed resistance, range, repetitions, and complexity of movement as indicated.    Date:  2022   Activity/Exercise Parameters   T-bar ER stretch with contract/relax x10   Sidelying ER 2x10   Supine punch - 5# 2x10   Scapular retraction with ER x10   Ed: precautions 2-3 min Treatment/Session Summary:    Treatment Assessment: Amber Contreras tolerated initial treatment well today. He will benefit from mobility exercises to improve ER ROM and rotator cuff strengthening exercises to increase strength and ability to lift away from body with less difficulty. Communication/Consultation:  None today  Equipment provided today:  HEP  Recommendations/Intent for next treatment session: Next visit will focus on ROM and RTC strengthening.     Total Treatment Billable Duration:  40 minute evaluation  Time In: 7816  Time Out: [address] [facility] Celeste, PT       Charge Capture  }Post Session Pain  PT Visit Info  [facility] Portal  MD Guidelines  Scanned Media  Benefits  MyChart    Future Appointments   Date Time Provider Jasmin Cullen   8/15/2022  8:30 AM Cristy Cords, PT [facility]   8/18/2022  8:30 AM Cristy Cords, PT [facility]   8/24/2022  8:30 AM Cristy Cords, PT [facility]   8/25/2022  9:30 AM Cristy Cords, PT [facility]   8/29/2022 11:30 AM Cristy Cords, PT [facility]   9/1/2022  8:30 AM Cristy Cords, PT [facility]   9/7/2022  9:30 AM Cristy Cords, PT [facility]   9/8/2022  9:30 AM Cristy Cords, PT [facility]   11/8/2022 10:15 AM Yumiko Hinds MD [facility]   2/9/2023 10:15 AM Abdullahi Rose MD [facility]

## 2022-08-11 NOTE — THERAPY EVALUATION
Brugess Client Cristo  : 1932  Primary: [facility] Medicare Complete  Secondary:  [address] @ [address]  [address] [place], [place] [id number]  Phone: [phone]  Fax: [phone] 2x/week for 60 days  Plan of Care/Certification Expiration Date: 10/10/22    PT Visit Info:    No data recorded    Visit Count:  1    OUTPATIENT PHYSICAL THERAPY:OP NOTE TYPE: Initial Assessment 2022               Episode  Appt Desk         Treatment Diagnosis:  Pain in Right Shoulder (M25.511)  Generalized Muscle Weakness (M62.81); Strain of R muscle and tendon of the rotator cuff (C93.708J)  Medical/Referring Diagnosis:  Pain in right shoulder [M25.511]  Other chronic pain [G89.29]  Referring Physician:  Alice Muir MD MD Orders:  PT Eval and Treat   Return MD Appt:  TBD  Date of Onset:    Allergies:  Patient has no known allergies. Restrictions/Precautions:    None  Medications Last Reviewed:  2022     SUBJECTIVE   History of Injury/Illness (Reason for Referral):  Kayley Yarbrough is a 81 yo male referred to physical therapy for chronic R shoulder pain that has worsened since 2019. He reports no DALIA. Chief complaint of R shoulder pain and difficulty lifting away from his body and keeping his arm overhead to comb hair. No difficulty playing golf and no reports of numbness/tingling into UE. No reports of neck pain. No images or injections at this time. PMH significant for open heart surgery in . Patient Stated Goal(s): \"Be able to lift away from body and comb hair without difficulty\"  Initial:     3/10 Post Session:     2/10  Past Medical History/Comorbidities:   Mr. Balwinder Wilson  has a past medical history of CAD (coronary artery disease), Cardiac murmur, GERD (gastroesophageal reflux disease), Hernia, inguinal, left, Hypercholesterolemia, and Hypothyroidism.   Mr. Balwinder Wilson  has a past surgical history that includes Hemorrhoid surgery (); pr cardiac surg procedure unlist (); and Colonoscopy (2017). Social History/Living Environment:   Lives With: Spouse  Type of Home: House  Home Layout: Two level   Prior Level of Function/Work/Activity:   Prior level of function: Independent  Occupation: Retired   Learning:   Does the patient/guardian have any barriers to learning?: No barriers  Will there be a co-learner?: No  What is the preferred language of the patient/guardian?: English  Is an  required?: No  How does the patient/guardian prefer to learn new concepts?: Listening   Fall Risk Scale: Total Score: 0  White Fall Risk: Low (0-24)   Dominant Side:  left handed      OBJECTIVE   OBSERVATION/POSTURE: Forward head, rounded shoulders. R scapular winging    PALPATION: TTP along R pecs, proximal biceps tendon, infraspinatus, teres complex    ROM: measured in degrees      RIGHT LEFT   Flexion 138*/152 150/160   Abduction 130* 160   External Rotation 42/51* 70/65   Internal Rotation 75 75     STRENGTH:      RIGHT LEFT   Flexion 4/5    Abduction 4/5    External Rotation 3/5    Internal Rotation 5/5      SPECIAL TESTS: (-) Empty can, (-) drop arm, (+) ER lag at 0 and 90 deg    NEUROLOGICAL SCREEN: normal    FUNCTIONAL MOBILITY      RIGHT LEFT   Apley ER T3 (with compensation) T3   Apley IR T10 T10   Horizontal adduction Superior shoulder Posterior shoulder     FLEXIBILITY: decreased pecs on R    JOINT MOBILITY: decreased R GH joint mobility in inferior and posterior direction    HK (Brenner-Roberto); ER (external rotation); IR (internal rotation); HA (horizontal adduction); HAB (horizontal abduction); GH (glenohumeral); AC (acromioclavicular); TTP (tenderness to palpation); UT (upper trapezius); * (painful); nt (not tested); WFL (within functional limits)  ASSESSMENT   Initial Assessment:  Rony Castro is a 81 yo male referred to physical therapy for chronic R shoulder pain that has worsened since 2019.  He exhibits a decrease in R shoulder P/AROM, decrease in posterior cuff strength and glenohumeral joint mobility. These restrictions make it difficult for him to reach overhead and away from body. He will benefit from skilled therapy to address these impairments to allow him to dress, lift and comb hair easier. Problem List: (Impacting functional limitations): Body Structures, Functions, Activity Limitations Requiring Skilled Therapeutic Intervention: Decreased functional mobility ; Decreased ADL status; Decreased ROM; Decreased body mechanics; Decreased strength; Increased pain; Decreased posture   Therapy Prognosis:   Therapy Prognosis: Excellent   Assessment Complexity:   Low Complexity  PLAN   Effective Dates: 8/11/2022 TO Plan of Care/Certification Expiration Date: 10/10/22   Frequency/Duration: 2x/week for 60 days  Interventions Planned (Treatment may consist of any combination of the following):    Current Treatment Recommendations: Strengthening; ROM; Neuromuscular re-education; Manual Therapy - Soft Tissue Mobilization; Manual Therapy - Joint Manipulation; Modalities; Integrated dry needling; Therapeutic activities   GOALS: (Goals have been discussed and agreed upon with patient.)  Discharge Goals: Time Frame: 8/11/22 - 10/10/22  Patient will be independent with home exercise program without assistance from therapist.   Patient will report 15/55 QDASH score to demonstrate improved functional capacity. Patient will demonstrate 150 degrees of R active shoulder flexion to enable him to reach top cabinet in kitchen. Patient will demonstrate 4/5 R shoulder ER strength to allow him to maintain arm overhead for enough time to comb his hair. Outcome Measure: Tool Used: Disabilities of the Arm, Shoulder and Hand (DASH) Questionnaire - Quick Version  Score:  Initial: 23/55 (8/11/22)  Most Recent: X/55 (Date: -- )   Interpretation of Score: The DASH is designed to measure the activities of daily living in person's with upper extremity dysfunction or pain.   Each section is scored on

## 2022-08-15 ENCOUNTER — HOSPITAL ENCOUNTER (OUTPATIENT)
Dept: PHYSICAL THERAPY | Age: 87
Setting detail: RECURRING SERIES
Discharge: HOME | End: 2022-08-18
Payer: MEDICARE

## 2022-08-15 PROCEDURE — 97140 MANUAL THERAPY 1/> REGIONS: CPT

## 2022-08-15 PROCEDURE — 97110 THERAPEUTIC EXERCISES: CPT

## 2022-08-15 NOTE — PROGRESS NOTES
Gabrielle Lemons  : 1932  Primary: [facility] Medicare Complete  Secondary:  [address] @ [address]  [address] [place] [address]  Phone: [phone]  Fax: [phone] 2x/week for 60 days  Plan of Care/Certification Expiration Date: 10/10/22      PT Visit Info:  No data recorded   Visit Count:  2   OUTPATIENT PHYSICAL THERAPY:OP NOTE TYPE: Treatment Note 8/15/2022       Episode  }Appt Desk             Treatment Diagnosis:  Pain in Right Shoulder (M25.511)  Generalized Muscle Weakness (M62.81); Strain of R muscle and tendon of the rotator cuff (X19.964S)  Medical/Referring Diagnosis:  Pain in right shoulder [M25.511]  Other chronic pain [G89.29]  Referring Physician:  Pravin Laurent MD MD Orders:  PT Eval and Treat   Date of Onset:    Allergies:   Patient has no known allergies. Restrictions/Precautions:  None  Interventions Planned (Treatment may consist of any combination of the following):    Current Treatment Recommendations: Strengthening; ROM; Neuromuscular re-education; Manual Therapy - Soft Tissue Mobilization; Manual Therapy - Joint Manipulation; Modalities; Integrated dry needling; Therapeutic activities     Subjective Comments: He worked on his car for a few hours this past weekend so his hands are tired. He has been doing some of his HEP     Initial:}     2/10Post Session:        2/10  Medications Last Reviewed:  8/15/2022  Updated Objective Findings:  None Today  Treatment   THERAPEUTIC EXERCISE: (30 minutes):    Exercises per grid below to improve mobility, strength, and coordination. Required minimal visual, verbal, manual, and tactile cues to promote proper body alignment, promote proper body posture, and promote proper body mechanics. Progressed resistance, range, repetitions, and complexity of movement as indicated.    Date:  8/15/2022   Activity/Exercise Parameters   T-bar ER stretch with contract/relax --   Sidelying ER 2x10   Supine punch - 5# 2x10   Scapular retraction with ER - yellow band 2x10   Rhythmic stabilizations 4 min   Supine ER - 2 and 3# x25 each   Rows - red 2x15   Shoulder extensions - red 2x10   Bicep curls - 5# 2x15 B         Manual Therapy:  (10 minutes)   Soft tissue mobilizations to R pecs and posterior shoulder   Shoulder PROM    Treatment/Session Summary:    Treatment Assessment: Michelle King tolerated treatment well today. He fatigues in R posterior cuff quickly with strengthening exercises but no reports of increased shoulder pain. Communication/Consultation:  None today  Equipment provided today:  None today  Recommendations/Intent for next treatment session: Next visit will focus on ROM and RTC strengthening.     Total Treatment Billable Duration:  40 minutes of treatment  Time In: 0830  Time Out: 0915    John Keenan, PT       Charge Capture  }Post Session Pain  PT Visit Info  [employer] Portal  MD Guidelines  Scanned Media  Benefits  MyChart    Future Appointments   Date Time Provider Jasmin Cullen   8/18/2022  8:30 AM John Keenan, PT [facility]   8/24/2022  8:30 AM John Keenan, PT [facility]   8/25/2022  9:30 AM John Keenan, PT [facility]   8/29/2022 11:30 AM John Keenan, PT [facility]   9/1/2022  8:30 AM John Keenan, PT [facility]   9/7/2022  9:30 AM John Keenan, PT [facility]   9/8/2022  9:30 AM John Keenan, PT [facility]   11/8/2022 10:15 AM Sean Alcantar MD [facility]   2/9/2023 10:15 AM Gunjan Maki MD [facility]

## 2022-08-18 ENCOUNTER — HOSPITAL ENCOUNTER (OUTPATIENT)
Dept: PHYSICAL THERAPY | Age: 87
Setting detail: RECURRING SERIES
Discharge: HOME | End: 2022-08-21
Payer: MEDICARE

## 2022-08-18 PROCEDURE — 97110 THERAPEUTIC EXERCISES: CPT

## 2022-08-18 PROCEDURE — 97140 MANUAL THERAPY 1/> REGIONS: CPT

## 2022-08-18 NOTE — PROGRESS NOTES
Benson Lemons  : 1932  Primary: [facility] Medicare Complete  Secondary:  [address] @ [address]  [address] [place] [address]  Phone: [phone]  Fax: [phone] 2x/week for 60 days  Plan of Care/Certification Expiration Date: 10/10/22      PT Visit Info:  No data recorded   Visit Count:  3   OUTPATIENT PHYSICAL THERAPY:OP NOTE TYPE: Treatment Note 2022       Episode  }Appt Desk             Treatment Diagnosis:  Pain in Right Shoulder (M25.511)  Generalized Muscle Weakness (M62.81); Strain of R muscle and tendon of the rotator cuff (C93.060D)  Medical/Referring Diagnosis:  Pain in right shoulder [M25.511]  Other chronic pain [G89.29]  Referring Physician:  Iris Singh MD MD Orders:  PT Eval and Treat   Date of Onset:    Allergies:   Patient has no known allergies. Restrictions/Precautions:  None  Interventions Planned (Treatment may consist of any combination of the following):    Current Treatment Recommendations: Strengthening; ROM; Neuromuscular re-education; Manual Therapy - Soft Tissue Mobilization; Manual Therapy - Joint Manipulation; Modalities; Integrated dry needling; Therapeutic activities     Subjective Comments: His shoulder is feeling better. Initial:}     2/10Post Session:        2/10  Medications Last Reviewed:  2022  Updated Objective Findings:  None Today  Treatment   THERAPEUTIC EXERCISE: (35 minutes):    Exercises per grid below to improve mobility, strength, and coordination. Required minimal visual, verbal, manual, and tactile cues to promote proper body alignment, promote proper body posture, and promote proper body mechanics. Progressed resistance, range, repetitions, and complexity of movement as indicated.    Date:  2022   Activity/Exercise Parameters   Active flexion supine with band around wrists - red 2x10   Sidelying ER - 2# 2x10   Supine punch - 5# 2x15   Shoulder ER walkout 2x10   Rhythmic stabilizations 4 min   Supine ER - 3# x30 each   Rows - red 2x15   Shoulder extensions - red 2x10   Bicep curls - 5# 2x15 B   Standing flexion with foam roll between hands 2x10     Manual Therapy:  (10 minutes)   Soft tissue mobilizations to R pecs and posterior shoulder   Shoulder PROM    Treatment/Session Summary:    Treatment Assessment: Mihir Rubio did well with exercises today although he fatigues quickly with posterior cuff endurance exercises. He is able to flex shoulder higher in standing with activation of posterior cuff vs open chain movement. Tps noted in muscle belly of infraspinatus and teres complex     Communication/Consultation:  None today  Equipment provided today:  None today  Recommendations/Intent for next treatment session: Next visit will focus on ROM and RTC strengthening.     Total Treatment Billable Duration:  45 minutes of treatment  Time In: 0830  Time Out: 0920    Anita Like, PT       Charge Capture  }Post Session Pain  PT Visit Info  [employer] Portal  MD Guidelines  Scanned Media  Benefits  MyChart    Future Appointments   Date Time Provider Jasmin Cullen   8/24/2022  8:30 AM Anita Like, PT [facility]   8/25/2022  9:30 AM Anita Like, PT [facility]   8/29/2022 11:30 AM Anita Like, PT [facility]   9/1/2022  8:30 AM Anita Like, PT [facility]   9/7/2022  9:30 AM Anita Like, PT [facility]   9/8/2022  9:30 AM Anita Like, PT [facility]   11/8/2022 10:15 AM Donn Fang MD [facility]   2/9/2023 10:15 AM Marino Tomlinson MD [facility]

## 2022-08-24 ENCOUNTER — HOSPITAL ENCOUNTER (OUTPATIENT)
Dept: PHYSICAL THERAPY | Age: 87
Setting detail: RECURRING SERIES
Discharge: HOME | End: 2022-08-27
Payer: MEDICARE

## 2022-08-24 PROCEDURE — 97140 MANUAL THERAPY 1/> REGIONS: CPT

## 2022-08-24 PROCEDURE — 97110 THERAPEUTIC EXERCISES: CPT

## 2022-08-24 NOTE — PROGRESS NOTES
Aditya Knowles  : 1932  Primary: [facility] Medicare Complete  Secondary:  [address] @ [address]  [address] [place] [address]  Phone: [phone]  Fax: [phone] 2x/week for 60 days  Plan of Care/Certification Expiration Date: 10/10/22      PT Visit Info:  No data recorded   Visit Count:  4   OUTPATIENT PHYSICAL THERAPY:OP NOTE TYPE: Treatment Note 2022       Episode  }Appt Desk             Treatment Diagnosis:  Pain in Right Shoulder (M25.511)  Generalized Muscle Weakness (M62.81); Strain of R muscle and tendon of the rotator cuff (P91.542P)  Medical/Referring Diagnosis:  Pain in right shoulder [M25.511]  Other chronic pain [G89.29]  Referring Physician:  Sheryle Binning., MD MD Orders:  PT Eval and Treat   Date of Onset:    Allergies:   Patient has no known allergies. Restrictions/Precautions:  None  Interventions Planned (Treatment may consist of any combination of the following):    Current Treatment Recommendations: Strengthening; ROM; Neuromuscular re-education; Manual Therapy - Soft Tissue Mobilization; Manual Therapy - Joint Manipulation; Modalities; Integrated dry needling; Therapeutic activities     Subjective Comments: Christiano Andrade reports he did something over the weekend that aggravated his R shoulder. He doesn't remember lifting anything heavy but says it's been bothering him more, especially at night. It didn't bother him playing golf last Friday     Initial:}     2/10Post Session:        2/10  Medications Last Reviewed:  2022  Updated Objective Findings:  None Today  Treatment   THERAPEUTIC EXERCISE: (35 minutes):    Exercises per grid below to improve mobility, strength, and coordination. Required minimal visual, verbal, manual, and tactile cues to promote proper body alignment, promote proper body posture, and promote proper body mechanics. Progressed resistance, range, repetitions, and complexity of movement as indicated. Date:  8/24/2022   Activity/Exercise Parameters   Active flexion supine with band around wrists - red 2x10   Sidelying ER - 2# --   Supine punch - 5# 2x15   Shoulder ER isometric at wall 2x10   Rhythmic stabilizations 4 min   Supine ER - 3# x30 each   Rows - red 2x15   Shoulder extensions - red 2x10   Bicep curls - 5# --   Shoulder IR - green band 2x15   UBE (fwd and back) 5 min     Manual Therapy:  (10 minutes)   Soft tissue mobilizations to R pecs and posterior shoulder   Shoulder PROM    Treatment/Session Summary:    Treatment Assessment: Annette Hung had a little more pain with exercises today. He notices some grinding sensation when lifting overhead which is more than likely a result of rotator cuff pathology. Communication/Consultation:  None today  Equipment provided today:  None today  Recommendations/Intent for next treatment session: Next visit will focus on ROM and RTC strengthening.     Total Treatment Billable Duration:  45 minutes of treatment  Time In: 0830  Time Out: 0920    Pool Koch, PT       Charge Capture  }Post Session Pain  PT Visit Info  [employer] Portal  MD Guidelines  Scanned Media  Benefits  MyChart    Future Appointments   Date Time Provider Jasmin Cullen   8/25/2022  9:30 AM Pool Koch, PT [facility]   8/29/2022 11:30 AM Pool Koch, PT [facility]   9/1/2022  8:30 AM Pool Koch, PT [facility]   9/7/2022  9:30 AM Leontine August, PT [facility]   9/8/2022  9:30 AM Leontine August, PT [facility]   11/8/2022 10:15 AM Ines Campbell MD [facility]   2/9/2023 10:15 AM Dorothy Healy MD [facility] [place] [facility]

## 2022-08-25 ENCOUNTER — HOSPITAL ENCOUNTER (OUTPATIENT)
Dept: PHYSICAL THERAPY | Age: 87
Setting detail: RECURRING SERIES
Discharge: HOME | End: 2022-08-28
Payer: MEDICARE

## 2022-08-25 PROCEDURE — 97110 THERAPEUTIC EXERCISES: CPT

## 2022-08-25 PROCEDURE — 97140 MANUAL THERAPY 1/> REGIONS: CPT

## 2022-08-25 NOTE — PROGRESS NOTES
Stephon Peters  : 1932  Primary: [facility] Medicare Complete  Secondary:  Sulema Jefferson @ [address] [address]  Phone: [phone]  Fax: [phone] 2x/week for 60 days  Plan of Care/Certification Expiration Date: 10/10/22      PT Visit Info:  No data recorded   Visit Count:  5   OUTPATIENT PHYSICAL THERAPY:OP NOTE TYPE: Treatment Note 2022       Episode  }Appt Desk             Treatment Diagnosis:  Pain in Right Shoulder (M25.511)  Generalized Muscle Weakness (M62.81); Strain of R muscle and tendon of the rotator cuff (O67.502D)  Medical/Referring Diagnosis:  Pain in right shoulder [M25.511]  Other chronic pain [G89.29]  Referring Physician:  Yumiko Willett MD MD Orders:  PT Eval and Treat   Date of Onset:    Allergies:   Patient has no known allergies. Restrictions/Precautions:  None  Interventions Planned (Treatment may consist of any combination of the following):    Current Treatment Recommendations: Strengthening; ROM; Neuromuscular re-education; Manual Therapy - Soft Tissue Mobilization; Manual Therapy - Joint Manipulation; Modalities; Integrated dry needling; Therapeutic activities     Subjective Comments: No pain after yesterdays treatment. He notices he can abduct arm easier now     Initial:}     2/10Post Session:        2/10  Medications Last Reviewed:  2022  Updated Objective Findings:  None Today  Treatment   THERAPEUTIC EXERCISE: (28 minutes):    Exercises per grid below to improve mobility, strength, and coordination. Required minimal visual, verbal, manual, and tactile cues to promote proper body alignment, promote proper body posture, and promote proper body mechanics. Progressed resistance, range, repetitions, and complexity of movement as indicated.    Date:  2022   Activity/Exercise Parameters   Standing bent over shoulder rows - 5# 2x15   Sidelying ER and abduction 2x20 each   Supine punch - 5# --   Shoulder ER isometric at wall --   Rhythmic stabilizations 4 min   Standing shoulder punches - red 2x15   Rows - green 2x15   Standing bent over shoulder extensions - 5# 2x15   Bicep curls - 5# 2x15 B   Shoulder IR - green band --   UBE (fwd and back) 6 min     Manual Therapy:  (10 minutes)   Soft tissue mobilizations to R pecs and posterior shoulder   Shoulder PROM    Treatment/Session Summary:    Treatment Assessment: Katie Hagen tolerated exercises well today. He was challenged with standing shoulder punches on R UE. He  needs continued posterior cuff strengthening to improve motion away from body     Communication/Consultation:  None today  Equipment provided today:  None today  Recommendations/Intent for next treatment session: Next visit will focus on ROM and RTC strengthening.     Total Treatment Billable Duration:  38 minutes of treatment  Time In: 0930  Time Out: [address] Alonso, PT       Charge Capture  }Post Session Pain  PT Visit Info  [facility] Portal  MD Guidelines  Scanned Media  Benefits  MyChart    Future Appointments   Date Time Provider [facility]   8/29/2022 11:30 AM Primo Radford, PT [facility]   9/1/2022  8:30 AM Primo Radford, PT [facility]   9/7/2022  9:30 AM Primo Radford, PT [facility]   9/8/2022  9:30 AM Primo Radford, PT [facility]   11/8/2022 10:15 AM Moreno Brown MD [facility]   2/9/2023 10:15 AM Erlin Lee MD [facility]

## 2022-08-29 ENCOUNTER — HOSPITAL ENCOUNTER (OUTPATIENT)
Dept: PHYSICAL THERAPY | Age: 87
Setting detail: RECURRING SERIES
Discharge: HOME | End: 2022-09-01
Payer: MEDICARE

## 2022-08-29 PROCEDURE — 97140 MANUAL THERAPY 1/> REGIONS: CPT

## 2022-08-29 PROCEDURE — 97110 THERAPEUTIC EXERCISES: CPT

## 2022-08-29 NOTE — PROGRESS NOTES
Raenelle Ganser  : 1932  Primary: [facility] Medicare Complete  Secondary:  [address] @ [address]  [address] [place] [address]  Phone: [phone]  Fax: [phone] 2x/week for 60 days  Plan of Care/Certification Expiration Date: 10/10/22      PT Visit Info:  No data recorded   Visit Count:  6   OUTPATIENT PHYSICAL THERAPY:OP NOTE TYPE: Treatment Note 2022       Episode  }Appt Desk             Treatment Diagnosis:  Pain in Right Shoulder (M25.511)  Generalized Muscle Weakness (M62.81); Strain of R muscle and tendon of the rotator cuff (S62.618N)  Medical/Referring Diagnosis:  Pain in right shoulder [M25.511]  Other chronic pain [G89.29]  Referring Physician:  Rodney Burroughs MD MD Orders:  PT Eval and Treat   Date of Onset:    Allergies:   Patient has no known allergies. Restrictions/Precautions:  None  Interventions Planned (Treatment may consist of any combination of the following):    Current Treatment Recommendations: Strengthening; ROM; Neuromuscular re-education; Manual Therapy - Soft Tissue Mobilization; Manual Therapy - Joint Manipulation; Modalities; Integrated dry needling; Therapeutic activities     Subjective Comments: Good response to last visit. Still difficult to comb hair due to decreased strength and endurance     Initial:}     2/10Post Session:        2/10  Medications Last Reviewed:  2022  Updated Objective Findings:  None Today  Treatment   THERAPEUTIC EXERCISE: (30 minutes):    Exercises per grid below to improve mobility, strength, and coordination. Required minimal visual, verbal, manual, and tactile cues to promote proper body alignment, promote proper body posture, and promote proper body mechanics. Progressed resistance, range, repetitions, and complexity of movement as indicated.    Date:  2022   Activity/Exercise Parameters   Standing bent over shoulder rows - 5# 2x15   Sidelying ER and abduction - 1# 2x20 each   Supine punch - 5# --   Shoulder ER isometric at wall 2x20   Rhythmic stabilizations 4 min   Standing shoulder punches - red 2x15   Rows - green 2x15   Active flexion inclined - 1# 2x15   Bicep curls - 5# --   Shoulder IR - green band 2x20   UBE (fwd and back) 6 min     Manual Therapy:  (10 minutes)   Soft tissue mobilizations to R pecs and posterior shoulder   Shoulder PROM    Treatment/Session Summary:    Treatment Assessment: Mer Chin did well with exercises today. He was able to mimic combing hair easier in inclined position so he will benefit from continued strengthening in modified gravity positions     Communication/Consultation:  None today  Equipment provided today:  None today  Recommendations/Intent for next treatment session: Next visit will focus on ROM and RTC strengthening.     Total Treatment Billable Duration:  40 minutes of treatment  Time In: 4893  Time Out: [facility] [address], PT       Charge Capture  }Post Session Pain  PT Visit Info  [employer] Portal  MD Guidelines  Scanned Media  Benefits  MyChart    Future Appointments   Date Time Provider Jasmin Coyi   9/1/2022  8:30 AM Mitch Bragg, PT [facility]   9/7/2022  9:30 AM Mitch Bragg, PT [facility]   9/8/2022  9:30 AM Mitch Bragg, PT [facility]   11/8/2022 10:15 AM Eulalio Farrell MD [facility]   2/9/2023 10:15 AM Edmundo Cash MD [facility]

## 2022-09-01 ENCOUNTER — HOSPITAL ENCOUNTER (OUTPATIENT)
Dept: PHYSICAL THERAPY | Age: 87
Setting detail: RECURRING SERIES
Discharge: HOME | End: 2022-09-04
Payer: MEDICARE

## 2022-09-01 PROCEDURE — 97110 THERAPEUTIC EXERCISES: CPT

## 2022-09-01 PROCEDURE — 97140 MANUAL THERAPY 1/> REGIONS: CPT

## 2022-09-01 NOTE — PROGRESS NOTES
Melvi Hidalgo  : 1932  Primary: [facility] Medicare Complete  Secondary:  [address] @ [address]  [address] [place] [address]  Phone: [phone]  Fax: [phone] 2x/week for 60 days  Plan of Care/Certification Expiration Date: 10/10/22      PT Visit Info:  No data recorded   Visit Count:  7   OUTPATIENT PHYSICAL THERAPY:OP NOTE TYPE: Treatment Note 2022       Episode  }Appt Desk             Treatment Diagnosis:  Pain in Right Shoulder (M25.511)  Generalized Muscle Weakness (M62.81); Strain of R muscle and tendon of the rotator cuff (A94.046R)  Medical/Referring Diagnosis:  Pain in right shoulder [M25.511]  Other chronic pain [G89.29]  Referring Physician:  Joleen Horowitz MD MD Orders:  PT Eval and Treat   Date of Onset:    Allergies:   Patient has no known allergies. Restrictions/Precautions:  None  Interventions Planned (Treatment may consist of any combination of the following):    Current Treatment Recommendations: Strengthening; ROM; Neuromuscular re-education; Manual Therapy - Soft Tissue Mobilization; Manual Therapy - Joint Manipulation; Modalities; Integrated dry needling; Therapeutic activities     Subjective Comments: Doan Vivienne reports improved mobility in R UE     Initial:}     2/10Post Session:        2/10  Medications Last Reviewed:  2022  Updated Objective Findings:  None Today  Treatment   THERAPEUTIC EXERCISE: (28 minutes):    Exercises per grid below to improve mobility, strength, and coordination. Required minimal visual, verbal, manual, and tactile cues to promote proper body alignment, promote proper body posture, and promote proper body mechanics. Progressed resistance, range, repetitions, and complexity of movement as indicated.    Date:  2022   Activity/Exercise Parameters   Supine flexion with band around wrists 2x15   Sidelying ER - 2# 2x15   Supine punch - 5# 2x20   Shoulder ER isometric at wall --   Rhythmic stabilizations 4 min   Standing shoulder punches - red --   Rows - green 2x15   Active flexion inclined - 1# 2x15   Tricep push downs - 20# cable 2x20   Shoulder IR - green band 2x20   UBE (fwd and back) 5 min     Manual Therapy:  (10 minutes)   Soft tissue mobilizations to R pecs and posterior shoulder   Shoulder PROM    Treatment/Session Summary:    Treatment Assessment: Rony Treviño exhibits improved AROM of R shoulder with some upper trap compensation. He exhibits increased stability with rhythmic stabilizations. He fatigues quickly with overhead exercises     Communication/Consultation:  None today  Equipment provided today:  None today  Recommendations/Intent for next treatment session: Next visit will focus on ROM and RTC strengthening.     Total Treatment Billable Duration:  40 minutes of treatment  Time In: 0830  Time Out: 0915    Maryse Barrera, PT       Charge Capture  }Post Session Pain  PT Visit Info  [employer] Portal  MD Guidelines  Scanned Media  Benefits  MyChart    Future Appointments   Date Time Provider Jasmin Alyse   9/7/2022  9:30 AM Maryse Barrera, [facility]   9/8/2022  9:30 AM Maryse Barrera PT [facility]   11/8/2022 10:15 AM Bettie Saldivar MD [facility]   2/9/2023 10:15 AM January Varela MD [facility]

## 2022-09-07 ENCOUNTER — HOSPITAL ENCOUNTER (OUTPATIENT)
Dept: PHYSICAL THERAPY | Age: 87
Setting detail: RECURRING SERIES
Discharge: HOME | End: 2022-09-10
Payer: MEDICARE

## 2022-09-07 PROCEDURE — 97140 MANUAL THERAPY 1/> REGIONS: CPT

## 2022-09-07 PROCEDURE — 97110 THERAPEUTIC EXERCISES: CPT

## 2022-09-08 ENCOUNTER — HOSPITAL ENCOUNTER (OUTPATIENT)
Dept: PHYSICAL THERAPY | Age: 87
Setting detail: RECURRING SERIES
Discharge: HOME | End: 2022-09-11
Payer: MEDICARE

## 2022-09-08 PROCEDURE — 97110 THERAPEUTIC EXERCISES: CPT

## 2022-09-08 PROCEDURE — 97140 MANUAL THERAPY 1/> REGIONS: CPT

## 2022-09-08 NOTE — THERAPY DISCHARGE
Maame Blas  : 1932  Primary: [facility] Medicare Complete  Secondary:  [address] @ [address]  [address] [place] [id number]  Phone: [phone]  Fax: [phone] 2x/week for 60 days  Plan of Care/Certification Expiration Date: 10/10/22      PT Visit Info:    No data recorded    Visit Count:  9    OUTPATIENT PHYSICAL THERAPY:OP NOTE TYPE: Discharge Summary 2022               Episode  Appt Desk         Treatment Diagnosis:  Pain in Right Shoulder (M25.511)  Generalized Muscle Weakness (M62.81); Strain of R muscle and tendon of the rotator cuff (F23.584J)  Medical/Referring Diagnosis:  Pain in right shoulder [M25.511]  Other chronic pain [G89.29]  Referring Physician:  Magaly Gramajo MD MD Orders:  PT Eval and Treat   Return MD Appt:  TBD  Date of Onset:    Allergies:  Patient has no known allergies. Restrictions/Precautions:    None  Medications Last Reviewed:  2022     SUBJECTIVE   History of Injury/Illness (Reason for Referral):  Stacy Greco is a [address] yo male referred to physical therapy for chronic R shoulder pain that has worsened since 2019. He reports no DALIA. Chief complaint of R shoulder pain and difficulty lifting away from his body and keeping his arm overhead to comb hair. No difficulty playing golf and no reports of numbness/tingling into UE. No reports of neck pain. No images or injections at this time. PMH significant for open heart surgery in . Patient Stated Goal(s): \"Be able to lift away from body and comb hair without difficulty\"  Initial:      1/10 Post Session:      1/10  Past Medical History/Comorbidities:   Mr. Re Rivas  has a past medical history of CAD (coronary artery disease), Cardiac murmur, GERD (gastroesophageal reflux disease), Hernia, inguinal, left, Hypercholesterolemia, and Hypothyroidism.   Mr. Re Rivas  has a past surgical history that includes Hemorrhoid surgery (); pr cardiac surg procedure unlist (); and Colonoscopy (2017). OBJECTIVE   OBSERVATION/POSTURE: Forward head, rounded shoulders. R scapular winging    PALPATION: TTP along R infraspinatus    ROM: measured in degrees      RIGHT LEFT   Flexion 150/165 150/160   Abduction 140 160   External Rotation 55 70/65   Internal Rotation 75 75     STRENGTH:      RIGHT LEFT   Flexion 4+/5    Abduction 4+/5    External Rotation 4/5    Internal Rotation 5/5      SPECIAL TESTS: (-) Empty can, (-) drop arm, (+) ER lag at 0 and 90 deg    NEUROLOGICAL SCREEN: normal    FUNCTIONAL MOBILITY      RIGHT LEFT   Apley ER T3 (with compensation) T3   Apley IR T10 T10   Horizontal adduction Superior shoulder Posterior shoulder       JOINT MOBILITY: improved mobility of GH joint    HK (Brenner-Roberto); ER (external rotation); IR (internal rotation); HA (horizontal adduction); HAB (horizontal abduction); GH (glenohumeral); AC (acromioclavicular); TTP (tenderness to palpation); UT (upper trapezius); * (painful); nt (not tested); WFL (within functional limits)  ASSESSMENT   Initial Assessment:  Veto Mccullough is a 81 yo male referred to physical therapy for chronic R shoulder pain that has worsened since 2019. He exhibits a decrease in R shoulder P/AROM, decrease in posterior cuff strength and glenohumeral joint mobility. These restrictions make it difficult for him to reach overhead and away from body. He will benefit from skilled therapy to address these impairments to allow him to dress, lift and comb hair easier. DISCHARGE SUMMARY (9/8/22): As of 9/8/2022, Panfilo Vega has attended 9 out of 9 scheduled visits, with 0 cancellation(s) and 0 no shows. He exhibits excellent improvement in shoulder AROM and is able to comb his hair easier in the mornings. He can reach farther overhead and exhibits improved posterior cuff activation with functional activities.  He has met all of his goals and feels comfortable being discharged to an independent home program. He was advised to contact therapist with questions or concerns. PLAN   Effective Dates: 9/8/2022 TO Plan of Care/Certification Expiration Date: 10/10/22       GOALS: (Goals have been discussed and agreed upon with patient.)  Discharge Goals: Time Frame: 8/11/22 - 10/10/22  Patient will be independent with home exercise program without assistance from therapist. MET  Patient will report 15/55 QDASH score to demonstrate improved functional capacity. MET  Patient will demonstrate 150 degrees of R active shoulder flexion to enable him to reach top cabinet in kitchen. MET  Patient will demonstrate 4/5 R shoulder ER strength to allow him to maintain arm overhead for enough time to comb his hair. MET           Outcome Measure: Tool Used: Disabilities of the Arm, Shoulder and Hand (DASH) Questionnaire - Quick Version  Score:  Initial: 23/55 (8/11/22)  Most Recent: 14/55 (Date: 9/8/22)   Interpretation of Score: The DASH is designed to measure the activities of daily living in person's with upper extremity dysfunction or pain. Each section is scored on a 1-5 scale, 5 representing the greatest disability. The scores of each section are added together for a total score of 55. Total Duration:  Time In: 0930  Time Out: 9997    Regarding Lan Lemons's therapy, I certify that the treatment plan above will be carried out by a therapist or under their direction. Thank you for this referral,  Jonathon Ray, PT     Referring Physician Signature: Henrietta Jarquin., * No Signature is Required for this note.         Post Session Pain  Charge Capture  PT Visit Info MD Guidelines  Fl[place]

## 2022-09-08 NOTE — PROGRESS NOTES
Karla Lovell  : 1932  Primary: [facility] Medicare Complete  Secondary:  [address] @ [address]  [address] [place], [place] [id number]  Phone: [phone]  Fax: [phone] 2x/week for 60 days  Plan of Care/Certification Expiration Date: 10/10/22      PT Visit Info:  No data recorded   Visit Count:  9   OUTPATIENT PHYSICAL TLVZAQN:UT NOTE TYPE: Treatment Note 2022       Episode  }Appt Desk             Treatment Diagnosis:  Pain in Right Shoulder (M25.511)  Generalized Muscle Weakness (M62.81); Strain of R muscle and tendon of the rotator cuff (S59.974B)  Medical/Referring Diagnosis:  Pain in right shoulder [M25.511]  Other chronic pain [G89.29]  Referring Physician:  Henrietta Jin MD MD Orders:  PT Eval and Treat   Date of Onset:    Allergies:   Patient has no known allergies. Restrictions/Precautions:  None  Interventions Planned (Treatment may consist of any combination of the following):    Current Treatment Recommendations: Strengthening; ROM; Neuromuscular re-education; Manual Therapy - Soft Tissue Mobilization; Manual Therapy - Joint Manipulation; Modalities; Integrated dry needling; Therapeutic activities     Subjective Comments: Deniz Freeman reports that his shoulder is doing well     Initial:}     1/10Post Session:        1/10  Medications Last Reviewed:  2022  Updated Objective Findings:   See discharge summary from today for full details  Treatment   THERAPEUTIC EXERCISE: (30 minutes):    Exercises per grid below to improve mobility, strength, and coordination. Required minimal visual, verbal, manual, and tactile cues to promote proper body alignment, promote proper body posture, and promote proper body mechanics. Progressed resistance, range, repetitions, and complexity of movement as indicated.    Date:  2022   Activity/Exercise Parameters   Shoulder ER - orange band 2x15   Sidelying ER - 2# 2x15   Supine punch - 5# --   Sidelying shoulder abduction

## 2022-09-19 RX ORDER — ROSUVASTATIN CALCIUM 10 MG/1
TABLET, COATED ORAL
Qty: 90 TABLET | Refills: 3 | OUTPATIENT
Start: 2022-09-19

## 2022-09-21 RX ORDER — ROSUVASTATIN CALCIUM 10 MG/1
TABLET, COATED ORAL
Qty: 90 TABLET | Refills: 3 | OUTPATIENT
Start: 2022-09-21

## 2022-11-08 ENCOUNTER — OFFICE VISIT (OUTPATIENT)
Dept: CARDIOLOGY CLINIC | Age: 87
End: 2022-11-08
Payer: MEDICARE

## 2022-11-08 VITALS
SYSTOLIC BLOOD PRESSURE: 166 MMHG | DIASTOLIC BLOOD PRESSURE: 80 MMHG | HEART RATE: 68 BPM | HEIGHT: 71 IN | WEIGHT: 187.4 LBS | BODY MASS INDEX: 26.23 KG/M2

## 2022-11-08 DIAGNOSIS — Z98.890 S/P MVR (MITRAL VALVE REPAIR): Primary | ICD-10-CM

## 2022-11-08 DIAGNOSIS — E78.00 HYPERCHOLESTEROLEMIA: ICD-10-CM

## 2022-11-08 DIAGNOSIS — Z95.2 S/P AVR (AORTIC VALVE REPLACEMENT): ICD-10-CM

## 2022-11-08 PROCEDURE — 1123F ACP DISCUSS/DSCN MKR DOCD: CPT | Performed by: INTERNAL MEDICINE

## 2022-11-08 PROCEDURE — 4004F PT TOBACCO SCREEN RCVD TLK: CPT | Performed by: INTERNAL MEDICINE

## 2022-11-08 PROCEDURE — 99214 OFFICE O/P EST MOD 30 MIN: CPT | Performed by: INTERNAL MEDICINE

## 2022-11-08 PROCEDURE — G8428 CUR MEDS NOT DOCUMENT: HCPCS | Performed by: INTERNAL MEDICINE

## 2022-11-08 PROCEDURE — G8417 CALC BMI ABV UP PARAM F/U: HCPCS | Performed by: INTERNAL MEDICINE

## 2022-11-08 PROCEDURE — G8484 FLU IMMUNIZE NO ADMIN: HCPCS | Performed by: INTERNAL MEDICINE

## 2022-11-08 RX ORDER — VALSARTAN 40 MG/1
40 TABLET ORAL DAILY
Qty: 30 TABLET | Refills: 3 | Status: SHIPPED | OUTPATIENT
Start: 2022-11-08 | End: 2022-12-01 | Stop reason: SDUPTHER

## 2022-11-08 RX ORDER — VALSARTAN AND HYDROCHLOROTHIAZIDE 80; 12.5 MG/1; MG/1
1 TABLET, FILM COATED ORAL DAILY
Qty: 30 TABLET | Refills: 3 | Status: SHIPPED | OUTPATIENT
Start: 2022-11-08 | End: 2022-11-08 | Stop reason: ALTCHOICE

## 2022-11-08 ASSESSMENT — ENCOUNTER SYMPTOMS
ABDOMINAL PAIN: 0
SHORTNESS OF BREATH: 0

## 2022-11-08 NOTE — PROGRESS NOTES
[address], [address] [facility], [address] [place] [address]  PHONE: [phone]    Cheng Luu  4/19/1932      SUBJECTIVE:   Cheng Luu is a 80 y.o. male seen for a follow up visit regarding the following:     Chief Complaint   Patient presents with    Follow-up     4mth    S/PMVR       HPI:    80-year-old male comes back for follow-up of a history of bioprosthetic aortic valve replacement mitral valve [place]. He is doing really well with that. He denies any recent shortness of breath or chest discomfort he still plays some golf      Past Medical History, Past Surgical History, Family history, Social History, and Medications were all reviewed with the patient today and updated as necessary. No Known Allergies  Past Medical History:   Diagnosis Date    CAD (coronary artery disease)     aortic  replaced 2005--AND MITRAL VALVE REPAIRED---no mi/ no stents--last echo 2018 per dr Jc Davila note--- followed by dr. Jc Davila    Cardiac murmur     \" significant\" per dr Jc Davila note in cc dated 12/2/20-- plans for echo 1/5/20    GERD (gastroesophageal reflux disease)     Jones's Esophagus    Hernia, inguinal, left     Hypercholesterolemia     Hypothyroidism Dx approx 2015     Past Surgical History:   Procedure Laterality Date    COLONOSCOPY  2017    Adrian Rdz 7[address]    [facility] CARDIAC SURG PROCEDURE UNLIST  2005    aortic valve  replaced and mitral  repaired     Family History   Problem Relation Age of Onset    Heart Disease Brother     Diabetes Daughter     No Known Problems Brother     Cancer Brother         prostate      Social History     Tobacco Use    Smoking status: Never    Smokeless tobacco: Never   Substance Use Topics    Alcohol use: Yes     Alcohol/week: 5.0 standard drinks       ROS:    Review of Systems   Constitutional: Negative for malaise/fatigue.    Cardiovascular:  Negative for chest pain, dyspnea on exertion, irregular heartbeat and palpitations. Respiratory:  Negative for shortness of breath. Hematologic/Lymphatic: Negative for bleeding problem. Gastrointestinal:  Negative for abdominal pain. PHYSICAL EXAM:    BP (!) 166/80   Pulse 68   Ht 5' 11\" (1.803 m)   Wt 187 lb 6.4 oz (85 kg)   BMI 26.14 kg/m²        Wt Readings from Last 3 Encounters:   11/08/22 187 lb 6.4 oz (85 kg)   08/08/22 180 lb (81.6 kg)   07/05/22 185 lb 12.8 oz (84.3 kg)     BP Readings from Last 3 Encounters:   11/08/22 (!) 166/80   08/08/22 134/82   07/05/22 (!) 140/70         Physical Exam  Constitutional:       General: He is not in acute distress. Cardiovascular:      Rate and Rhythm: Normal rate. Heart sounds: Murmur heard. Early systolic murmur is present with a grade of 2/6. Pulmonary:      Effort: Pulmonary effort is normal.      Breath sounds: Normal breath sounds. No rales. Abdominal:      General: There is no distension. Skin:     General: Skin is warm. Neurological:      Mental Status: He is alert. Medical problems and test results were reviewed with the patient today. No results found for any visits on 11/08/22. Lab Results   Component Value Date/Time     02/07/2022 11:24 AM    K 4.6 02/07/2022 11:24 AM     02/07/2022 11:24 AM    CO2 24 02/07/2022 11:24 AM    BUN 22 02/07/2022 11:24 AM    GFRAA 73 02/07/2022 11:24 AM   Echo this past year showed a normal ejection fraction over 50% moderate septal thickening is a bioprosthetic valve with mild to moderate stenosis and insufficiency mild mitral insufficiency      ASSESSMENT and PLAN    Glynn Napier was seen today for follow-up. Diagnoses and all orders for this visit:    S/P MVR (mitral valve repair) well post repair follow-up echo is only shows some minimal insufficiency    S/P AVR (aortic valve replacement) he has an older bioprosthetic valve does have some stenosis insufficiency asymptomatic.   We will need to continue to follow this he may eventually end up needing a TAVR right now he has no symptoms at all    Hypercholesterolemia is currently taking the Crestor. Hypertension blood pressure is really high says not as high as home is 166 today and reported multiple Diovan 40. He says in the past he tried blood pressure meds and his blood pressure bottomed out so we will start with a very low-dose  Other orders  -     Discontinue: valsartan-hydroCHLOROthiazide (DIOVAN HCT) 80-12.5 MG per tablet; Take 1 tablet by mouth daily  -     valsartan (DIOVAN) 40 MG tablet; Take 1 tablet by mouth daily      [unfilled]      No follow-up provider specified.     Khloe Patterson MD  11/8/2022  9:10 PM

## 2022-11-09 ENCOUNTER — TELEPHONE (OUTPATIENT)
Dept: CARDIOLOGY CLINIC | Age: 87
End: 2022-11-09

## 2022-11-09 NOTE — TELEPHONE ENCOUNTER
3 hours after taking valsartan 40 mg qd, as ordered by Dr. Suzi Alexandra at yesterday's office visit,  BP-77/56, 94/45.   5 hours after taking valsartan, BP-140/91. HR-62-68. No dizziness, light headedness, or faint-feeling. States he will continue to monitor BP, and call back if BP is low, tomorrow. Does not expect call back, unless Dr. Suzi Alexandra wants to make med changes. Patient asks if any recommendations for today's low BP 3 hours after taking valsartan.

## 2022-11-09 NOTE — TELEPHONE ENCOUNTER
Patient called stating that Dr Macie Johnson had prescribed a new BP medication :    valsartan (DIOVAN) 40 MG tablet      Patient states his BP 94/45 this morning. Patient states he is maybe a little weak.

## 2022-11-09 NOTE — TELEPHONE ENCOUNTER
Advised patient of Dr. Schuster Sample response. Patient verbalized understanding, but states he is not certain this morning's low BP is accurate, and will try taking  losartan, tomorrow. He agrees to call back, if BP is low.

## 2022-11-09 NOTE — TELEPHONE ENCOUNTER
MD Yoni Wagner, RN  Caller: Unspecified (Today,  9:22 AM)  He can stay off the low-dose valsartan does have to monitor his blood pressure

## 2022-11-30 NOTE — TELEPHONE ENCOUNTER
Pt requesting medication refill on levothyroxine 125 mg, allopurinol 300 mg, pravastatan 20 mg and zeratamil 300 mg please use [facility] mail order, please call pt back thank you.

## 2022-12-01 RX ORDER — ROSUVASTATIN CALCIUM 10 MG/1
TABLET, COATED ORAL
Qty: 90 TABLET | Refills: 3 | OUTPATIENT
Start: 2022-12-01

## 2022-12-01 RX ORDER — LEVOTHYROXINE SODIUM 0.12 MG/1
125 TABLET ORAL
Qty: 90 TABLET | Refills: 3 | Status: SHIPPED | OUTPATIENT
Start: 2022-12-01

## 2022-12-01 RX ORDER — ROSUVASTATIN CALCIUM 10 MG/1
10 TABLET, COATED ORAL DAILY
Qty: 90 TABLET | Refills: 3 | Status: SHIPPED | OUTPATIENT
Start: 2022-12-01

## 2022-12-01 RX ORDER — LEVOTHYROXINE SODIUM 0.12 MG/1
TABLET ORAL
Qty: 90 TABLET | Refills: 3 | OUTPATIENT
Start: 2022-12-01

## 2022-12-01 RX ORDER — VALSARTAN 40 MG/1
40 TABLET ORAL DAILY
Qty: 90 TABLET | Refills: 3 | Status: SHIPPED | OUTPATIENT
Start: 2022-12-01

## 2022-12-01 NOTE — TELEPHONE ENCOUNTER
Resend the prescription for mail [facility] 90 days. Send to [facility] to sign.

## 2022-12-01 NOTE — TELEPHONE ENCOUNTER
MEDICATION REFILL REQUEST      Name of Medication:  Valsartan  Dose:  40 mg  Frequency:  QD  Quantity:  90  Days' supply:  80 with refills    Pharmacy Name/Location:  [address] [place] [address]  if this can not be called in to [facility] ask that you call him and let him know asap    Please do not send this medication to [facility]

## 2022-12-09 RX ORDER — LEVOTHYROXINE SODIUM 0.12 MG/1
125 TABLET ORAL
Qty: 90 TABLET | Refills: 3 | Status: SHIPPED | OUTPATIENT
Start: 2022-12-09

## 2022-12-09 NOTE — TELEPHONE ENCOUNTER
Pt medication synthroid  125 mg have not been order please resend medication to [facility], please call pt back thank you.

## 2023-04-13 ENCOUNTER — OFFICE VISIT (OUTPATIENT)
Dept: SURGERY | Age: 88
End: 2023-04-13

## 2023-04-13 VITALS
HEART RATE: 67 BPM | DIASTOLIC BLOOD PRESSURE: 91 MMHG | HEIGHT: 71 IN | SYSTOLIC BLOOD PRESSURE: 168 MMHG | WEIGHT: 188 LBS | BODY MASS INDEX: 26.32 KG/M2

## 2023-04-13 DIAGNOSIS — Z87.19 HISTORY OF LEFT INGUINAL HERNIA REPAIR: ICD-10-CM

## 2023-04-13 DIAGNOSIS — Z98.890 HISTORY OF LEFT INGUINAL HERNIA REPAIR: ICD-10-CM

## 2023-04-13 DIAGNOSIS — K40.90 RIGHT INGUINAL HERNIA: Primary | ICD-10-CM

## 2023-04-13 DIAGNOSIS — Z98.890 S/P MVR (MITRAL VALVE REPAIR): ICD-10-CM

## 2023-04-13 DIAGNOSIS — Z95.2 S/P AVR (AORTIC VALVE REPLACEMENT): ICD-10-CM

## 2023-04-13 ASSESSMENT — ENCOUNTER SYMPTOMS
GASTROINTESTINAL NEGATIVE: 1
RESPIRATORY NEGATIVE: 1
EYES NEGATIVE: 1
ALLERGIC/IMMUNOLOGIC NEGATIVE: 1

## 2023-05-03 PROBLEM — I10 PRIMARY HYPERTENSION: Status: ACTIVE | Noted: 2023-05-03

## 2023-05-03 PROBLEM — Z95.2 S/P AVR (AORTIC VALVE REPLACEMENT): Status: ACTIVE | Noted: 2020-12-02

## 2023-05-04 ENCOUNTER — OFFICE VISIT (OUTPATIENT)
Dept: CARDIOLOGY CLINIC | Age: 88
End: 2023-05-04
Payer: MEDICARE

## 2023-05-04 VITALS
DIASTOLIC BLOOD PRESSURE: 82 MMHG | BODY MASS INDEX: 26.71 KG/M2 | HEIGHT: 71 IN | WEIGHT: 190.8 LBS | SYSTOLIC BLOOD PRESSURE: 136 MMHG | HEART RATE: 68 BPM

## 2023-05-04 DIAGNOSIS — Z95.2 S/P AVR (AORTIC VALVE REPLACEMENT): ICD-10-CM

## 2023-05-04 DIAGNOSIS — Z98.890 S/P MVR (MITRAL VALVE REPAIR): Primary | ICD-10-CM

## 2023-05-04 DIAGNOSIS — E78.00 HYPERCHOLESTEROLEMIA: ICD-10-CM

## 2023-05-04 DIAGNOSIS — Z01.810 PREOP CARDIOVASCULAR EXAM: ICD-10-CM

## 2023-05-04 DIAGNOSIS — I10 PRIMARY HYPERTENSION: ICD-10-CM

## 2023-05-04 PROCEDURE — 1036F TOBACCO NON-USER: CPT | Performed by: INTERNAL MEDICINE

## 2023-05-04 PROCEDURE — 1123F ACP DISCUSS/DSCN MKR DOCD: CPT | Performed by: INTERNAL MEDICINE

## 2023-05-04 PROCEDURE — G8417 CALC BMI ABV UP PARAM F/U: HCPCS | Performed by: INTERNAL MEDICINE

## 2023-05-04 PROCEDURE — 93000 ELECTROCARDIOGRAM COMPLETE: CPT | Performed by: INTERNAL MEDICINE

## 2023-05-04 PROCEDURE — 99214 OFFICE O/P EST MOD 30 MIN: CPT | Performed by: INTERNAL MEDICINE

## 2023-05-04 PROCEDURE — G8427 DOCREV CUR MEDS BY ELIG CLIN: HCPCS | Performed by: INTERNAL MEDICINE

## 2023-05-04 RX ORDER — ACETAMINOPHEN 160 MG
4000 TABLET,DISINTEGRATING ORAL
COMMUNITY

## 2023-05-04 ASSESSMENT — ENCOUNTER SYMPTOMS: COUGH: 0

## 2023-05-04 NOTE — PROGRESS NOTES
[address] [place]  [address], [address] [email or web address] [address] [place] [address]  PHONE: Bisi Gant  4/19/1932      SUBJECTIVE:   Byron Sage is a 80 y.o. male seen for a follow up visit regarding the following:     Chief Complaint   Patient presents with    Hyperlipidemia    Other     S/p AVR       HPI:    Patient presents for new patient evaluation. He needs to establish a new cardiologist with the senior living of Dr. Agustín Torres. Review of notes showed that he had a bioprosthetic aortic valve placed in 2005 with a mitral valve repair in [facility].  Of records show that his last echocardiogram was performed in June. Results as below:    Echo (6/27/22):  EF 50-55%. AVR:  MG 30. PG 50. Peak velocity 3.5 m/s. Mitral valve repair:  MG 4. Mild MR. Mild/moderate TR.  RVSP 32. Records from general surgery reviewed. Appears that he is planning to have a inguinal hernia repair. He remains very active at his age. He plays golf and plans to play tomorrow. He walks around his neighborhood and accomplishes 5000 steps a day. He does have symptoms of dyspnea on exertion but states that these have not changed recently. No exertional chest pain or syncope. Past Medical History, Past Surgical History, Family history, Social History, and Medications were all reviewed with the patient today and updated as necessary.            Current Outpatient Medications:     Cholecalciferol (VITAMIN D3) 50 MCG (2000 UT) CAPS, Take 2 capsules by mouth, Disp: , Rfl:     levothyroxine (SYNTHROID) 125 MCG tablet, Take 1 tablet by mouth every morning (before breakfast), Disp: 90 tablet, Rfl: 3    valsartan (DIOVAN) 40 MG tablet, Take 1 tablet by mouth daily (Patient taking differently: Take 2 tablets by mouth daily), Disp: 90 tablet, Rfl: 3    rosuvastatin (CRESTOR) 10 MG tablet, Take 1 tablet by mouth daily, Disp: 90 tablet, Rfl: 3    omeprazole (PRILOSEC) 40 MG delayed release capsule, Take 1

## 2023-05-15 ENCOUNTER — TELEPHONE (OUTPATIENT)
Age: 88
End: 2023-05-15

## 2023-05-15 ENCOUNTER — ANESTHESIA EVENT (OUTPATIENT)
Dept: SURGERY | Age: 88
End: 2023-05-15
Payer: MEDICARE

## 2023-05-15 DIAGNOSIS — Z95.2 S/P AVR (AORTIC VALVE REPLACEMENT): Primary | ICD-10-CM

## 2023-05-15 NOTE — TELEPHONE ENCOUNTER
Patient called with results, voiced understanding order for echo in 6 months entered. Sent to scheduling//luigiab    ----- Message from Renetta Sims MD sent at 5/14/2023  9:23 PM EDT -----  Please call patient. His echocardiogram shows that his heart function is normal.  His valve appears to be moderately stenotic and slightly worse than his previous echocardiogram.  He is still except for his to undergo his inguinal hernia surgery from a cardiac perspective. I would like to recheck echo in 6 months prior to his next visit.    Thank you

## 2023-05-16 ENCOUNTER — HOSPITAL ENCOUNTER (OUTPATIENT)
Age: 88
Setting detail: OUTPATIENT SURGERY
Discharge: HOME | End: 2023-05-16
Attending: SURGERY | Admitting: SURGERY
Payer: MEDICARE

## 2023-05-16 ENCOUNTER — ANESTHESIA (OUTPATIENT)
Dept: SURGERY | Age: 88
End: 2023-05-16
Payer: MEDICARE

## 2023-05-16 VITALS
HEART RATE: 71 BPM | OXYGEN SATURATION: 98 % | WEIGHT: 186.7 LBS | SYSTOLIC BLOOD PRESSURE: 128 MMHG | HEIGHT: 72 IN | RESPIRATION RATE: 18 BRPM | DIASTOLIC BLOOD PRESSURE: 56 MMHG | TEMPERATURE: 98 F | BODY MASS INDEX: 25.29 KG/M2

## 2023-05-16 DIAGNOSIS — K40.90 UNILATERAL INGUINAL HERNIA WITHOUT OBSTRUCTION OR GANGRENE, RECURRENCE NOT SPECIFIED: Primary | ICD-10-CM

## 2023-05-16 PROCEDURE — 2500000003 HC RX 250 WO HCPCS: Performed by: NURSE ANESTHETIST, CERTIFIED REGISTERED

## 2023-05-16 PROCEDURE — 2500000003 HC RX 250 WO HCPCS: Performed by: SURGERY

## 2023-05-16 PROCEDURE — 2580000003 HC RX 258: Performed by: ANESTHESIOLOGY

## 2023-05-16 PROCEDURE — 7100000001 HC PACU RECOVERY - ADDTL 15 MIN: Performed by: SURGERY

## 2023-05-16 PROCEDURE — 7100000011 HC PHASE II RECOVERY - ADDTL 15 MIN: Performed by: SURGERY

## 2023-05-16 PROCEDURE — C1781 MESH (IMPLANTABLE): HCPCS | Performed by: SURGERY

## 2023-05-16 PROCEDURE — 3700000001 HC ADD 15 MINUTES (ANESTHESIA): Performed by: SURGERY

## 2023-05-16 PROCEDURE — 2580000003 HC RX 258: Performed by: NURSE ANESTHETIST, CERTIFIED REGISTERED

## 2023-05-16 PROCEDURE — 6360000002 HC RX W HCPCS: Performed by: SURGERY

## 2023-05-16 PROCEDURE — 6360000002 HC RX W HCPCS: Performed by: NURSE ANESTHETIST, CERTIFIED REGISTERED

## 2023-05-16 PROCEDURE — 3700000000 HC ANESTHESIA ATTENDED CARE: Performed by: SURGERY

## 2023-05-16 PROCEDURE — 2709999900 HC NON-CHARGEABLE SUPPLY: Performed by: SURGERY

## 2023-05-16 PROCEDURE — 49505 PRP I/HERN INIT REDUC >5 YR: CPT | Performed by: SURGERY

## 2023-05-16 PROCEDURE — 6370000000 HC RX 637 (ALT 250 FOR IP): Performed by: ANESTHESIOLOGY

## 2023-05-16 PROCEDURE — 7100000010 HC PHASE II RECOVERY - FIRST 15 MIN: Performed by: SURGERY

## 2023-05-16 PROCEDURE — 3600000003 HC SURGERY LEVEL 3 BASE: Performed by: SURGERY

## 2023-05-16 PROCEDURE — 7100000000 HC PACU RECOVERY - FIRST 15 MIN: Performed by: SURGERY

## 2023-05-16 PROCEDURE — 3600000013 HC SURGERY LEVEL 3 ADDTL 15MIN: Performed by: SURGERY

## 2023-05-16 DEVICE — IMPLANTABLE DEVICE: Type: IMPLANTABLE DEVICE | Site: GROIN | Status: FUNCTIONAL

## 2023-05-16 RX ORDER — BUPIVACAINE HYDROCHLORIDE AND EPINEPHRINE 5; 5 MG/ML; UG/ML
INJECTION, SOLUTION EPIDURAL; INTRACAUDAL; PERINEURAL PRN
Status: DISCONTINUED | OUTPATIENT
Start: 2023-05-16 | End: 2023-05-16 | Stop reason: ALTCHOICE

## 2023-05-16 RX ORDER — PROCHLORPERAZINE EDISYLATE 5 MG/ML
5 INJECTION INTRAMUSCULAR; INTRAVENOUS
Status: DISCONTINUED | OUTPATIENT
Start: 2023-05-16 | End: 2023-05-16 | Stop reason: HOSPADM

## 2023-05-16 RX ORDER — ETOMIDATE 2 MG/ML
INJECTION INTRAVENOUS PRN
Status: DISCONTINUED | OUTPATIENT
Start: 2023-05-16 | End: 2023-05-16 | Stop reason: SDUPTHER

## 2023-05-16 RX ORDER — OXYCODONE HYDROCHLORIDE 5 MG/1
10 TABLET ORAL PRN
Status: DISCONTINUED | OUTPATIENT
Start: 2023-05-16 | End: 2023-05-16 | Stop reason: HOSPADM

## 2023-05-16 RX ORDER — EPHEDRINE SULFATE/0.9% NACL/PF 50 MG/5 ML
SYRINGE (ML) INTRAVENOUS PRN
Status: DISCONTINUED | OUTPATIENT
Start: 2023-05-16 | End: 2023-05-16 | Stop reason: SDUPTHER

## 2023-05-16 RX ORDER — GLYCOPYRROLATE 0.2 MG/ML
INJECTION INTRAMUSCULAR; INTRAVENOUS PRN
Status: DISCONTINUED | OUTPATIENT
Start: 2023-05-16 | End: 2023-05-16 | Stop reason: SDUPTHER

## 2023-05-16 RX ORDER — KETOROLAC TROMETHAMINE 30 MG/ML
INJECTION, SOLUTION INTRAMUSCULAR; INTRAVENOUS PRN
Status: DISCONTINUED | OUTPATIENT
Start: 2023-05-16 | End: 2023-05-16 | Stop reason: SDUPTHER

## 2023-05-16 RX ORDER — FENTANYL CITRATE 50 UG/ML
INJECTION, SOLUTION INTRAMUSCULAR; INTRAVENOUS PRN
Status: DISCONTINUED | OUTPATIENT
Start: 2023-05-16 | End: 2023-05-16 | Stop reason: SDUPTHER

## 2023-05-16 RX ORDER — PROPOFOL 10 MG/ML
INJECTION, EMULSION INTRAVENOUS PRN
Status: DISCONTINUED | OUTPATIENT
Start: 2023-05-16 | End: 2023-05-16 | Stop reason: SDUPTHER

## 2023-05-16 RX ORDER — HYDROCODONE BITARTRATE AND ACETAMINOPHEN 5; 325 MG/1; MG/1
1 TABLET ORAL EVERY 4 HOURS PRN
Qty: 20 TABLET | Refills: 0 | Status: SHIPPED | OUTPATIENT
Start: 2023-05-16 | End: 2023-05-21

## 2023-05-16 RX ORDER — SODIUM CHLORIDE, SODIUM LACTATE, POTASSIUM CHLORIDE, CALCIUM CHLORIDE 600; 310; 30; 20 MG/100ML; MG/100ML; MG/100ML; MG/100ML
INJECTION, SOLUTION INTRAVENOUS CONTINUOUS
Status: DISCONTINUED | OUTPATIENT
Start: 2023-05-16 | End: 2023-05-16 | Stop reason: HOSPADM

## 2023-05-16 RX ORDER — SODIUM CHLORIDE, SODIUM LACTATE, POTASSIUM CHLORIDE, CALCIUM CHLORIDE 600; 310; 30; 20 MG/100ML; MG/100ML; MG/100ML; MG/100ML
INJECTION, SOLUTION INTRAVENOUS CONTINUOUS PRN
Status: DISCONTINUED | OUTPATIENT
Start: 2023-05-16 | End: 2023-05-16 | Stop reason: SDUPTHER

## 2023-05-16 RX ORDER — SODIUM CHLORIDE 0.9 % (FLUSH) 0.9 %
5-40 SYRINGE (ML) INJECTION EVERY 12 HOURS SCHEDULED
Status: DISCONTINUED | OUTPATIENT
Start: 2023-05-16 | End: 2023-05-16 | Stop reason: HOSPADM

## 2023-05-16 RX ORDER — SODIUM CHLORIDE 0.9 % (FLUSH) 0.9 %
5-40 SYRINGE (ML) INJECTION PRN
Status: DISCONTINUED | OUTPATIENT
Start: 2023-05-16 | End: 2023-05-16 | Stop reason: HOSPADM

## 2023-05-16 RX ORDER — LIDOCAINE HYDROCHLORIDE 10 MG/ML
1 INJECTION, SOLUTION INFILTRATION; PERINEURAL
Status: DISCONTINUED | OUTPATIENT
Start: 2023-05-16 | End: 2023-05-16 | Stop reason: HOSPADM

## 2023-05-16 RX ORDER — ONDANSETRON 2 MG/ML
INJECTION INTRAMUSCULAR; INTRAVENOUS PRN
Status: DISCONTINUED | OUTPATIENT
Start: 2023-05-16 | End: 2023-05-16 | Stop reason: SDUPTHER

## 2023-05-16 RX ORDER — OXYCODONE HYDROCHLORIDE 5 MG/1
5 TABLET ORAL PRN
Status: DISCONTINUED | OUTPATIENT
Start: 2023-05-16 | End: 2023-05-16 | Stop reason: HOSPADM

## 2023-05-16 RX ORDER — SODIUM CHLORIDE 9 MG/ML
INJECTION, SOLUTION INTRAVENOUS PRN
Status: DISCONTINUED | OUTPATIENT
Start: 2023-05-16 | End: 2023-05-16 | Stop reason: HOSPADM

## 2023-05-16 RX ORDER — ACETAMINOPHEN 500 MG
1000 TABLET ORAL ONCE
Status: COMPLETED | OUTPATIENT
Start: 2023-05-16 | End: 2023-05-16

## 2023-05-16 RX ORDER — ONDANSETRON 2 MG/ML
4 INJECTION INTRAMUSCULAR; INTRAVENOUS
Status: DISCONTINUED | OUTPATIENT
Start: 2023-05-16 | End: 2023-05-16 | Stop reason: HOSPADM

## 2023-05-16 RX ORDER — LIDOCAINE HYDROCHLORIDE 20 MG/ML
INJECTION, SOLUTION EPIDURAL; INFILTRATION; INTRACAUDAL; PERINEURAL PRN
Status: DISCONTINUED | OUTPATIENT
Start: 2023-05-16 | End: 2023-05-16 | Stop reason: SDUPTHER

## 2023-05-16 RX ADMIN — PROPOFOL 20 MG: 10 INJECTION, EMULSION INTRAVENOUS at 13:42

## 2023-05-16 RX ADMIN — KETOROLAC TROMETHAMINE 30 MG: 30 INJECTION, SOLUTION INTRAMUSCULAR; INTRAVENOUS at 14:15

## 2023-05-16 RX ADMIN — Medication 2000 MG: at 13:03

## 2023-05-16 RX ADMIN — ETOMIDATE 16 MG: 2 INJECTION, SOLUTION INTRAVENOUS at 13:12

## 2023-05-16 RX ADMIN — LIDOCAINE HYDROCHLORIDE 60 MG: 20 INJECTION, SOLUTION EPIDURAL; INFILTRATION; INTRACAUDAL; PERINEURAL at 13:12

## 2023-05-16 RX ADMIN — FENTANYL CITRATE 50 MCG: 50 INJECTION, SOLUTION INTRAMUSCULAR; INTRAVENOUS at 13:05

## 2023-05-16 RX ADMIN — SODIUM CHLORIDE, POTASSIUM CHLORIDE, SODIUM LACTATE AND CALCIUM CHLORIDE: 600; 310; 30; 20 INJECTION, SOLUTION INTRAVENOUS at 11:55

## 2023-05-16 RX ADMIN — GLYCOPYRROLATE 0.2 MG: 0.2 INJECTION INTRAMUSCULAR; INTRAVENOUS at 13:19

## 2023-05-16 RX ADMIN — FENTANYL CITRATE 50 MCG: 50 INJECTION, SOLUTION INTRAMUSCULAR; INTRAVENOUS at 13:33

## 2023-05-16 RX ADMIN — ONDANSETRON 4 MG: 2 INJECTION INTRAMUSCULAR; INTRAVENOUS at 14:15

## 2023-05-16 RX ADMIN — SODIUM CHLORIDE, SODIUM LACTATE, POTASSIUM CHLORIDE, AND CALCIUM CHLORIDE: 600; 310; 30; 20 INJECTION, SOLUTION INTRAVENOUS at 13:03

## 2023-05-16 RX ADMIN — ACETAMINOPHEN 1000 MG: 500 TABLET, FILM COATED ORAL at 12:18

## 2023-05-16 RX ADMIN — PROPOFOL 100 MG: 10 INJECTION, EMULSION INTRAVENOUS at 13:12

## 2023-05-16 RX ADMIN — Medication 10 MG: at 13:37

## 2023-05-16 RX ADMIN — PHENYLEPHRINE HYDROCHLORIDE 10 MCG: 10 INJECTION INTRAVENOUS at 13:19

## 2023-05-16 ASSESSMENT — PAIN - FUNCTIONAL ASSESSMENT: PAIN_FUNCTIONAL_ASSESSMENT: 0-10

## 2023-05-16 NOTE — DISCHARGE INSTRUCTIONS
Jada Tanner M.D.  [phone]    Instructions following Hernia Repair:    ACTIVITY:   Try to take a few short walks with help around the house later today. It is very important to take short walks to avoid blood clots and pneumonia. You may be light-headed or sleepy from anesthesia, so be careful going up and down stairs. Avoid any activity that involves lifting/pushing more than 30 pounds until your followup appointment  DIET:   Drink only clear, non-carbonated liquids when you first get home (sugar-free if you are diabetic), such as Gatorade, chicken broth, etc.   Later  you may resume a more normal diet, depending on how you feel   Using Miralax or other over the counter laxative is ok if you develop constipation    PAIN:   You will be given a prescription for pain medication. Try to take the pain medication with food, even a few crackers. You may also use Tylenol, Motrin, Advil, or Aleve instead of the prescription pain medication. Do no take Tylenol and the prescription pain medication within 6 hours of each other. URINARY RETENTION: If you are unable to empty your bladder within 6 hours after returning home, please go to your nearest Emergency Department or Urgent Care for urinary catheterization. WOUND CARE:   You may shower the day after surgery, unless instructed otherwise. It is not uncommon for the incisions to ooze or drain blood-tinged fluid. You may remove the clear dressing on the fifth postoperative day. Incisions will sometimes develop redness around them, up to the size of a quarter, as well as a hard lumpy feel. If this redness continues to get larger, please call the office. FOLLOW UP:   Your follow-up appointment is usually made when your surgery is arranged. Please call the office if you are not sure of this appointment. CALL THE DOCTOR IF:   You have a temperature higher than 101.5° Fahrenheit for more than 6 hours. You have severe nausea or vomiting.    You

## 2023-05-16 NOTE — ANESTHESIA PROCEDURE NOTES
Arterial Line:    An arterial line was placed using ultrasound guidance, in the OR for the following indication(s): continuous blood pressure monitoring and blood sampling needed. A 20 gauge (size) (length), Arrow (type) catheter was placed, Seldinger technique used, into the right radial artery, secured by tape and Tegaderm. Events:  patient tolerated procedure well with no complications and EBL < 5mL. 5/16/2023 1:06 PM5/16/2023 1:11 PM  Anesthesiologist: Aliza Masters MD  Performed: Anesthesiologist   Preanesthetic Checklist  Completed: patient identified, IV checked, site marked, risks and benefits discussed, surgical/procedural consents, equipment checked, pre-op evaluation, timeout performed, anesthesia consent given, oxygen available and monitors applied/VS acknowledged

## 2023-05-16 NOTE — H&P
Primary/Requesting provider: Diana Contreras          Chief Complaint   Patient presents with    Follow-up       R Inguinal Hernia          Patient is a 80 y.o. male who presents for discussion of a RIGHT inguinal hernia; he is known from repair of his left inguinal hernia 12/17/2020. He reports no complications/concerns regarding the prior left sided repair, but he did notice a \"bubble\" in his right groin about 2 weeks ago. He was able to manually reduce it and has not had any overt recurrence of the bulging. He denies any groin pain, n/v, f/c, alteration of his baseline bowel/bladder function. There is no associated testicular or thight discomfort. He remains very active and would be interested in proactive intervention, as was discussed prior to his [facility] repair. Medications:          Current Outpatient Medications on File Prior to Visit   Medication Sig Dispense Refill    levothyroxine (SYNTHROID) 125 MCG tablet Take 1 tablet by mouth every morning (before breakfast) 90 tablet 3    valsartan (DIOVAN) 40 MG tablet Take 1 tablet by mouth daily 90 tablet 3    rosuvastatin (CRESTOR) 10 MG tablet Take 1 tablet by mouth daily 90 tablet 3    aspirin 81 MG EC tablet Take by mouth daily        omeprazole (PRILOSEC) 40 MG delayed release capsule Take 1 capsule by mouth daily          No current facility-administered medications on file prior to visit.          Allergies: No Known Allergies      Past History:  Past Medical History        Past Medical History:   Diagnosis Date    CAD (coronary artery disease)       aortic  replaced 2005--AND MITRAL VALVE REPAIRED---no mi/ no stents--last echo 2018 per dr Jennifer Murdock note--- followed by dr. Jennifer Murdock    Cardiac murmur       \" significant\" per dr Jennifer Murdock note in cc dated 12/2/20-- plans for echo 1/5/20    GERD (gastroesophageal reflux disease)       Jones's Esophagus    Hernia, inguinal, left      Hypercholesterolemia      Hypothyroidism Dx approx 2015         Past

## 2023-05-16 NOTE — ANESTHESIA POSTPROCEDURE EVALUATION
Department of Anesthesiology  Postprocedure Note    Patient: Papito Sullivan  MRN: [id number]  YOB: 1932  Date of evaluation: 5/16/2023      Procedure Summary     Date: 05/16/23 Room / [facility]: [facility] / [facility]    Anesthesia Start: 3273 Anesthesia Stop: 1440    Procedure: HERNIA INGUINAL REPAIR (Right: Groin) Diagnosis:       Inguinal hernia      (Inguinal hernia [K40.90])    Surgeons: Titus Zavala MD Responsible Provider: Kelly Dewitt MD    Anesthesia Type: General ASA Status: 3          Anesthesia Type: General    Jose Phase I:      Jose Phase II:        Anesthesia Post Evaluation    Patient location during evaluation: PACU  Patient participation: complete - patient participated  Level of consciousness: awake and alert  Airway patency: patent  Nausea & Vomiting: no nausea and no vomiting  Complications: no  Cardiovascular status: hemodynamically stable  Respiratory status: acceptable  Hydration status: euvolemic  Comments: Blood pressure 132/63, pulse 74, temperature 98 °F (36.7 °C), temperature source Temporal, resp. rate 16, height 5' 11.5\" (1.816 m), weight 186 lb 11.2 oz (84.7 kg), SpO2 97 %.       Pt stable for discharge from PACU  Multimodal analgesia pain management approach

## 2023-05-16 NOTE — ANESTHESIA PRE PROCEDURE
Department of Anesthesiology  Preprocedure Note       Name:  Larissa Butler   Age:  80 y.o.  :  1932                                          MRN:  [id number]         Date:  2023      Surgeon: Nasir Lobo):  Basilio Godwin MD    Procedure: Procedure(s): HERNIA INGUINAL REPAIR    Medications prior to admission:   Prior to Admission medications    Medication Sig Start Date End Date Taking?  Authorizing Provider   Cholecalciferol (VITAMIN D3) 50 MCG ( UT) CAPS Take 2 capsules by mouth    Historical Provider, MD   levothyroxine (SYNTHROID) 125 MCG tablet Take 1 tablet by mouth every morning (before breakfast)  Patient taking differently: Take 100 mcg by mouth every morning (before breakfast) 22   Rani Santoyo MD   valsartan (DIOVAN) 40 MG tablet Take 1 tablet by mouth daily  Patient taking differently: Take 2 tablets by mouth daily 22   Maricarmen Em MD   rosuvastatin (CRESTOR) 10 MG tablet Take 1 tablet by mouth daily 22   Rani Santoyo MD   omeprazole (PRILOSEC) 40 MG delayed release capsule Take 1 capsule by mouth daily 22   Ar Automatic Reconciliation       Current medications:    Current Facility-Administered Medications   Medication Dose Route Frequency Provider Last Rate Last Admin    lidocaine 1 % injection 1 mL  1 mL IntraDERmal Once PRN VINCE Bravo MD        acetaminophen (TYLENOL) tablet 1,000 mg  1,000 mg Oral Once VINCE Bravo MD        lactated ringers IV soln infusion   IntraVENous Continuous VINCE Bravo MD        sodium chloride flush 0.9 % injection 5-40 mL  5-40 mL IntraVENous 2 times per day VINCE Bravo MD        sodium chloride flush 0.9 % injection 5-40 mL  5-40 mL IntraVENous PRN VINCE Bravo MD        0.9 % sodium chloride infusion   IntraVENous PRN VINCE Bravo MD        ceFAZolin (ANCEF) 2000 mg in sterile water 20 mL IV syringe  2,000 mg IntraVENous Once Basilio Godwin MD           Allergies:  No

## 2023-05-16 NOTE — OP NOTE
Operative Report    Patient: Vandana Mclean MRN: [id number]     YOB: 1932  Age: 80 y.o. Sex: male       Date of Surgery: 5/16/2023     Preoperative Diagnosis: Inguinal hernia [K40.90]     Postoperative Diagnosis: RIGHT inguinal hernia    Procedure:  right  Indirect Inguinal Hernia Repair with PerFix Plug     Anesthesia: General     EBL: minimal    Complications: none    Indications:  As outlined in History and Physical.    Procedure Details   Informed consent was obtained and the patient was brought to the operating room and placed on the table in a supine position. After successful induction of anesthesia, the groin was prepped and draped in the standard fashion. An incision was made in the suprainguinal region and was carried down through the subcutaneous tissues with cautery to the external oblique aponeurosis which was incised parallel to its fibers including opening through the external ring. one nerve(s) was/were identified on the floor of the canal and were avoided throughout the procedure. The cord structures were mobilized at the level of the pubic tubercle and isolated with a penrose drain. The cord was then dissected in its proximal third and an indirect sac was encountered; a cord lipoma was not  identified . The sac was dissected from the surrounding cord structures with cautery, tracing it through and then reducing it through the defect into the preperitoneal space taking care to avoid injury to the vas deferens. A small PerFix plug was placed into the preperitoneal space through the internal ring and the inner petals of the plug were sutured to the surrounding tissue with 2-0 Prolene; this included the conjoined tendon above and  the internal oblique muscle laterally. The direct space was examined and there was not a defect identified. The floor was quite attenuated but not inappropriately so for age.   The patch was then placed on the floor of the inguinal canal and sutured in

## 2023-05-17 ASSESSMENT — ENCOUNTER SYMPTOMS
ABDOMINAL PAIN: 0
CONSTIPATION: 0
NAUSEA: 0
VOMITING: 0
RESPIRATORY NEGATIVE: 1
ABDOMINAL DISTENTION: 0
DIARRHEA: 0
GASTROINTESTINAL NEGATIVE: 1

## 2023-05-18 ENCOUNTER — HOSPITAL ENCOUNTER (EMERGENCY)
Age: 88
Discharge: HOME | End: 2023-05-18
Attending: EMERGENCY MEDICINE
Payer: MEDICARE

## 2023-05-18 VITALS
BODY MASS INDEX: 25.19 KG/M2 | RESPIRATION RATE: 17 BRPM | TEMPERATURE: 97.6 F | HEART RATE: 83 BPM | WEIGHT: 186 LBS | HEIGHT: 72 IN | DIASTOLIC BLOOD PRESSURE: 79 MMHG | SYSTOLIC BLOOD PRESSURE: 144 MMHG | OXYGEN SATURATION: 98 %

## 2023-05-18 DIAGNOSIS — R33.9 URINARY RETENTION: Primary | ICD-10-CM

## 2023-05-18 DIAGNOSIS — Z87.19 S/P RIGHT INGUINAL HERNIA REPAIR: ICD-10-CM

## 2023-05-18 DIAGNOSIS — Z98.890 S/P RIGHT INGUINAL HERNIA REPAIR: ICD-10-CM

## 2023-05-18 PROCEDURE — 51702 INSERT TEMP BLADDER CATH: CPT

## 2023-05-18 PROCEDURE — 99283 EMERGENCY DEPT VISIT LOW MDM: CPT

## 2023-05-18 ASSESSMENT — PAIN - FUNCTIONAL ASSESSMENT: PAIN_FUNCTIONAL_ASSESSMENT: 0-10

## 2023-05-18 ASSESSMENT — ENCOUNTER SYMPTOMS
ABDOMINAL PAIN: 0
ABDOMINAL DISTENTION: 0
COLOR CHANGE: 0
COUGH: 0
NAUSEA: 0
DIARRHEA: 0
VOMITING: 0
SHORTNESS OF BREATH: 0
BLOOD IN STOOL: 0

## 2023-05-18 ASSESSMENT — PAIN SCALES - GENERAL: PAINLEVEL_OUTOF10: 3

## 2023-05-18 NOTE — ED TRIAGE NOTES
Right inguinal hernia operation done on tuesday. Reports since last night, unable to fully void.  Reports urgency/frequency and only able to dribble

## 2023-05-18 NOTE — ED PROVIDER NOTES
agreement with plan. Instructed to return if symptoms worsen or progress in any way. Risk of Complications and/or Morbidity of Patient Management:  Patient was discharged risks and benefits of hospitalization were considered. Discussion with external consultants. Shared medical decision making was utilized in creating the patients health plan today. History      Donny Pollard is a 80 y.o. male who presents to the Emergency Department with chief complaint of urinary retention. Chief Complaint   Patient presents with    Urinary Retention      42-year-old male with history of hypertension, status post aortic valve replacement, status post mitral replacement, hypothyroidism, hyperlipidemia, status post right inguinal hernia repair on 5/16/23 Dr. Mary Nava presents with complaint of urinary retention. States he had difficulty urinating since yesterday. States immediately postoperatively he denies any difficulty urinating. Denies hematuria, testicular pain or swelling. Denies dysuria, flank pain, abdominal pain, nausea, vomiting, fever, chills. Denies any drainage or bleeding from surgical incision site. Patient with no other complaints. The history is provided by the patient. No  was used. Review of Systems   Constitutional:  Negative for fatigue and fever. Respiratory:  Negative for cough and shortness of breath. Cardiovascular:  Negative for chest pain. Gastrointestinal:  Negative for abdominal distention, abdominal pain, blood in stool, diarrhea, nausea and vomiting. Genitourinary:  Positive for decreased urine volume. Negative for dysuria, flank pain, penile discharge, penile swelling, scrotal swelling and testicular pain. Musculoskeletal:  Negative for myalgias. Skin:  Negative for color change and rash. Neurological:  Negative for dizziness, weakness, light-headedness and headaches.      Physical Exam     Vitals signs and nursing note

## 2023-05-18 NOTE — DISCHARGE INSTRUCTIONS
Keep Victor catheter and leg bag intact. Follow-up with general surgery as directed for recheck. Return if symptoms worsen or progress in any way.

## 2023-05-18 NOTE — ED NOTES
I have reviewed discharge instructions with the patient. The patient and spouse verbalized understanding. Patient left ED via Discharge Method: ambulatory to Home with (family/friend). Opportunity for questions and clarification provided. Patient given 0 scripts. No esign         To continue your aftercare when you leave the hospital, you may receive an automated call from our care team to check in on how you are doing. This is a free service and part of our promise to provide the best care and service to meet your aftercare needs.  If you have questions, or wish to unsubscribe from this service please call [phone]. Thank you for Choosing our [address] [place] [address] Emergency Department.        Ric Quiñones RN  05/18/23 [id number]

## 2023-05-19 ENCOUNTER — TELEPHONE (OUTPATIENT)
Dept: SURGERY | Age: 88
End: 2023-05-19

## 2023-05-19 NOTE — TELEPHONE ENCOUNTER
Called patient regarding his message that he left yesterday at 11:30 am. Patient was c/o \"the constant urge to urinate\". When I went to call back the patient yesterday, I noticed that he had went to the ED around 2:00 pm. I called this morning just to check on patient, and see if he needed anything. Had to LVM as patient did not answer the telephone.

## 2023-05-23 ENCOUNTER — TELEPHONE (OUTPATIENT)
Dept: SURGERY | Age: 88
End: 2023-05-23

## 2023-05-23 NOTE — TELEPHONE ENCOUNTER
Patent called this morning, he has a post-op apt with you tomorrow. He is wanting to make sure that you are planing on removing the catheter that was placed in the ED.

## 2023-05-23 NOTE — TELEPHONE ENCOUNTER
Attempted to return call to patient regarding his message that he left this morning regarding his catheter. He did not mention in his message what he needed, so I asked for a return call when I LVM.

## 2023-05-24 ENCOUNTER — OFFICE VISIT (OUTPATIENT)
Dept: SURGERY | Age: 88
End: 2023-05-24

## 2023-05-24 DIAGNOSIS — Z09 POSTOPERATIVE EXAMINATION: Primary | ICD-10-CM

## 2023-05-24 PROCEDURE — 99024 POSTOP FOLLOW-UP VISIT: CPT | Performed by: NURSE PRACTITIONER

## 2023-05-24 NOTE — PATIENT INSTRUCTIONS
-Follow up PRN  -Victor removed and patient instructed to follow up with his PCP for any further urinary retention and/or urinary issues for further evaluation.  -If urinary retention develops again, go to ED to be evaluated for any emergent issue  -Keep incisional area clean and dry  -May remove steri-strips in 7 to 10 days  -No lifting > 20 pounds x 2 more weeks post op  -Wear scrotal support for any scrotal edema   -Follow up PRN with PCP for routine medical management

## 2023-08-23 RX ORDER — AMOXICILLIN 500 MG/1
CAPSULE ORAL
Qty: 6 CAPSULE | OUTPATIENT
Start: 2023-08-23

## 2023-10-25 RX ORDER — ROSUVASTATIN CALCIUM 10 MG/1
10 TABLET, COATED ORAL DAILY
Qty: 90 TABLET | Refills: 3 | OUTPATIENT
Start: 2023-10-25

## 2023-11-06 ENCOUNTER — OFFICE VISIT (OUTPATIENT)
Age: 88
End: 2023-11-06
Payer: MEDICARE

## 2023-11-06 VITALS
HEART RATE: 68 BPM | WEIGHT: 185.4 LBS | BODY MASS INDEX: 25.11 KG/M2 | SYSTOLIC BLOOD PRESSURE: 132 MMHG | DIASTOLIC BLOOD PRESSURE: 64 MMHG | HEIGHT: 72 IN

## 2023-11-06 DIAGNOSIS — Z95.2 S/P AVR (AORTIC VALVE REPLACEMENT): Primary | ICD-10-CM

## 2023-11-06 DIAGNOSIS — E78.00 HYPERCHOLESTEROLEMIA: ICD-10-CM

## 2023-11-06 DIAGNOSIS — Z98.890 S/P MVR (MITRAL VALVE REPAIR): ICD-10-CM

## 2023-11-06 DIAGNOSIS — I10 PRIMARY HYPERTENSION: ICD-10-CM

## 2023-11-06 PROCEDURE — G8484 FLU IMMUNIZE NO ADMIN: HCPCS | Performed by: INTERNAL MEDICINE

## 2023-11-06 PROCEDURE — G8417 CALC BMI ABV UP PARAM F/U: HCPCS | Performed by: INTERNAL MEDICINE

## 2023-11-06 PROCEDURE — 1036F TOBACCO NON-USER: CPT | Performed by: INTERNAL MEDICINE

## 2023-11-06 PROCEDURE — G8427 DOCREV CUR MEDS BY ELIG CLIN: HCPCS | Performed by: INTERNAL MEDICINE

## 2023-11-06 PROCEDURE — 1123F ACP DISCUSS/DSCN MKR DOCD: CPT | Performed by: INTERNAL MEDICINE

## 2023-11-06 PROCEDURE — 99214 OFFICE O/P EST MOD 30 MIN: CPT | Performed by: INTERNAL MEDICINE

## 2023-11-06 ASSESSMENT — ENCOUNTER SYMPTOMS: SHORTNESS OF BREATH: 0

## 2023-11-06 NOTE — PROGRESS NOTES
Priority: High    Inguinal hernia 04/13/2023     Priority: Low     Added automatically from request for surgery [id number]      S/P AVR (aortic valve replacement) 12/02/2020     Priority: Low     1. Bioprosthetic valve 2005  23 mm  Edward bovine pericardial valve  2. Echo (6/27/22):  EF 50-55%. AVR:  MG 30. PG 50. Peak velocity 3.5 m/s. MV repair:  MG 4. Mild MR. Mild/moderate TR.  RVSP 32.    3.  Echo (5/10/2023): EF 63%.  + DD.  AVR: MG 38.  PG 60. Peak gradient 3.9.  DI 0.29  4. Echo (10/23/2023): EF 55-60%. +DD/  AVR:  MG 29.  PG 47. Moderate MR. Mild/moderate TR.  RVSP 32. S/P MVR (mitral valve repair) 12/02/2020     Priority: Low    Hypercholesterolemia      Priority: Low    Hypothyroidism      Priority: Low        Social History     Tobacco Use    Smoking status: Never     Passive exposure: Never    Smokeless tobacco: Never   Substance Use Topics    Alcohol use: Yes     Alcohol/week: 3.0 standard drinks of alcohol     Types: 3 Drinks containing 0.5 oz of alcohol per week       ROS:    Review of Systems   Constitutional: Negative for malaise/fatigue. Cardiovascular:  Negative for chest pain. Respiratory:  Negative for shortness of breath. Musculoskeletal:  Positive for arthritis. Neurological:  Negative for focal weakness. Psychiatric/Behavioral:  Negative for depression. PHYSICAL EXAM:  Wt Readings from Last 3 Encounters:   11/06/23 84.1 kg (185 lb 6.4 oz)   10/23/23 84.4 kg (186 lb)   05/18/23 84.4 kg (186 lb)     BP Readings from Last 3 Encounters:   11/06/23 132/64   10/23/23 (!) 140/64   05/18/23 (!) 144/79     Pulse Readings from Last 3 Encounters:   11/06/23 68   05/18/23 83   05/16/23 71       Physical Exam  Constitutional:       General: He is not in acute distress. Neck:      Vascular: No carotid bruit. Cardiovascular:      Rate and Rhythm: Normal rate and regular rhythm. Heart sounds: Murmur heard. Pulmonary:      Effort: No respiratory distress.

## 2024-01-02 ENCOUNTER — OFFICE VISIT (OUTPATIENT)
Dept: ORTHOPEDIC SURGERY | Age: 89
End: 2024-01-02
Payer: MEDICARE

## 2024-01-02 DIAGNOSIS — M25.511 RIGHT SHOULDER PAIN, UNSPECIFIED CHRONICITY: Primary | ICD-10-CM

## 2024-01-02 PROCEDURE — G8484 FLU IMMUNIZE NO ADMIN: HCPCS | Performed by: ORTHOPAEDIC SURGERY

## 2024-01-02 PROCEDURE — 1123F ACP DISCUSS/DSCN MKR DOCD: CPT | Performed by: ORTHOPAEDIC SURGERY

## 2024-01-02 PROCEDURE — 20610 DRAIN/INJ JOINT/BURSA W/O US: CPT | Performed by: ORTHOPAEDIC SURGERY

## 2024-01-02 PROCEDURE — G8427 DOCREV CUR MEDS BY ELIG CLIN: HCPCS | Performed by: ORTHOPAEDIC SURGERY

## 2024-01-02 PROCEDURE — 1036F TOBACCO NON-USER: CPT | Performed by: ORTHOPAEDIC SURGERY

## 2024-01-02 PROCEDURE — G8417 CALC BMI ABV UP PARAM F/U: HCPCS | Performed by: ORTHOPAEDIC SURGERY

## 2024-01-02 PROCEDURE — 99204 OFFICE O/P NEW MOD 45 MIN: CPT | Performed by: ORTHOPAEDIC SURGERY

## 2024-01-02 RX ORDER — METHYLPREDNISOLONE ACETATE 40 MG/ML
80 INJECTION, SUSPENSION INTRA-ARTICULAR; INTRALESIONAL; INTRAMUSCULAR; SOFT TISSUE ONCE
Status: COMPLETED | OUTPATIENT
Start: 2024-01-02 | End: 2024-01-02

## 2024-01-02 RX ADMIN — METHYLPREDNISOLONE ACETATE 80 MG: 40 INJECTION, SUSPENSION INTRA-ARTICULAR; INTRALESIONAL; INTRAMUSCULAR; SOFT TISSUE at 15:04

## 2024-01-02 NOTE — PROGRESS NOTES
Name: Saulo Lemons  YOB: 1932  Gender: male  MRN: [id number]    CC:   Chief Complaint   Patient presents with    Shoulder Pain     Right shoulder    Right shoulder pain    HPI:  This patient presents with a chronic history of Right shoulder pain.  The patient states it began hurting approximately 6 years ago after playing golf without specific trauma or mechanism of injury.  States that its intermittent and really does not affect his activities of daily living too much.  Patient denies numbness, tingling, popping and clicking.  Patient notes some trap and Scap pain.  He performed physical therapy and is set up to exercise at the gym to strengthen for the next 2 months.  He states overall his biggest complaint is some weakness rather than pain.      No Known Allergies  Past Medical History:   Diagnosis Date    CAD (coronary artery disease)     aortic  replaced 2005--AND MITRAL VALVE REPAIRED---no mi/ no stents--last echo 2018 per dr vieira note--- followed by dr. vieira    Cardiac murmur     \" significant\" per dr vieira note in cc dated 12/2/20-- plans for echo 1/5/20    GERD (gastroesophageal reflux disease)     Jones's Esophagus    Hernia, inguinal, left     Hypercholesterolemia     Hypertension     managed with medication    Hypothyroidism Dx approx 2015     Past Surgical History:   Procedure Laterality Date    COLONOSCOPY  2017    HEMORRHOID SURGERY  1957    HERNIA REPAIR Right 5/16/2023    HERNIA INGUINAL REPAIR performed by Timoteo Harrison MD at [facility] MAIN OR    INGUINAL HERNIA REPAIR Left 11/2021    MS UNLISTED PROCEDURE CARDIAC SURGERY  2005    aortic valve  replaced and mitral  repaired     Family History   Problem Relation Age of Onset    Heart Disease Brother     Diabetes Daughter     No Known Problems Brother     Cancer Brother         prostate     Social History     Socioeconomic History    Marital status:      Spouse name: Not on file    Number of children:

## 2024-01-02 NOTE — PATIENT INSTRUCTIONS
shoulders will come off the floor or bed a little bit.  Slowly relax and return your arms to the starting position.  Repeat 8 to 12 times.  Arm raise to the side    During this strengthening exercise, your arm should stay about 30 degrees to the front of your side.  Slowly raise your injured arm to the side, with your thumb facing up. Raise your arm 60 degrees at the most (shoulder level is 90 degrees).  Hold the position for 3 to 5 seconds. Then lower your arm back to your side. If you need to, bring your \"good\" arm across your body and place it under the elbow as you lower your injured arm. Use your good arm to keep your injured arm from dropping down too fast.  Repeat 8 to 12 times.  When you first start out, don't hold any extra weight in your hand. As you get stronger, you may use a 1-pound to 2-pound dumbbell or a small can of food.  Shoulder flexion (isometric)    Hold your affected arm against your body, and bend your elbow about 90 degrees (like the letter \"L\"), with your hand straight ahead. Make a closed fist, with your thumb on top.  Face a wall, and step forward until your fist is against the wall. Your elbow should still be against your body.  Press your fist against the wall with about half of your strength or less. Don't let your body move backward as you press.  Hold for about 6 seconds, and then relax.  Repeat 8 to 12 times.  It's a good idea to repeat these steps with your other arm.  Scapular exercise: Wall push-ups    This exercise is best done with your fingers somewhat turned out, rather than straight up and down.  Stand facing a wall, about 12 inches to 18 inches away.  Place your hands on the wall at shoulder height.  Slowly bend your elbows and bring your face to the wall. Keep your back and hips straight.  Push back to where you started.  Repeat 8 to 12 times.  When you can do this exercise against a wall comfortably, you can try it against a counter. You can then slowly progress to the

## 2024-05-13 ENCOUNTER — OFFICE VISIT (OUTPATIENT)
Age: 89
End: 2024-05-13
Payer: MEDICARE

## 2024-05-13 ENCOUNTER — TELEPHONE (OUTPATIENT)
Age: 89
End: 2024-05-13

## 2024-05-13 VITALS
DIASTOLIC BLOOD PRESSURE: 64 MMHG | HEIGHT: 71 IN | BODY MASS INDEX: 26.4 KG/M2 | HEART RATE: 66 BPM | WEIGHT: 188.6 LBS | SYSTOLIC BLOOD PRESSURE: 128 MMHG

## 2024-05-13 DIAGNOSIS — Z98.890 S/P MVR (MITRAL VALVE REPAIR): ICD-10-CM

## 2024-05-13 DIAGNOSIS — Z95.2 S/P AVR (AORTIC VALVE REPLACEMENT): ICD-10-CM

## 2024-05-13 DIAGNOSIS — E78.00 HYPERCHOLESTEROLEMIA: ICD-10-CM

## 2024-05-13 DIAGNOSIS — I10 PRIMARY HYPERTENSION: Primary | ICD-10-CM

## 2024-05-13 PROCEDURE — G8427 DOCREV CUR MEDS BY ELIG CLIN: HCPCS | Performed by: INTERNAL MEDICINE

## 2024-05-13 PROCEDURE — 1123F ACP DISCUSS/DSCN MKR DOCD: CPT | Performed by: INTERNAL MEDICINE

## 2024-05-13 PROCEDURE — G8417 CALC BMI ABV UP PARAM F/U: HCPCS | Performed by: INTERNAL MEDICINE

## 2024-05-13 PROCEDURE — 99214 OFFICE O/P EST MOD 30 MIN: CPT | Performed by: INTERNAL MEDICINE

## 2024-05-13 PROCEDURE — 1036F TOBACCO NON-USER: CPT | Performed by: INTERNAL MEDICINE

## 2024-05-13 PROCEDURE — 93000 ELECTROCARDIOGRAM COMPLETE: CPT | Performed by: INTERNAL MEDICINE

## 2024-05-13 RX ORDER — VALSARTAN 80 MG/1
80 TABLET ORAL DAILY
Qty: 90 TABLET | Refills: 3
Start: 2024-05-13 | End: 2024-05-14 | Stop reason: CLARIF

## 2024-05-13 ASSESSMENT — ENCOUNTER SYMPTOMS: SHORTNESS OF BREATH: 0

## 2024-05-13 NOTE — TELEPHONE ENCOUNTER
Patient called stating that he is not taking valsartan 80 mg  he is taking Valsartan 80/12.5 mg by Jane Mishra. Patient is wanting to know if he should be taking the Valsartan 80 mg or the Valsartan 80/12.5 mg. B/p in office today 128/64.    Patient informed that Dr. Cox will be asked, call back with doctors response. Patient voiced understanding//luigiab

## 2024-05-13 NOTE — PROGRESS NOTES
[facility] CARDIOLOGY, [address], [facility] [address]  [place] [address]  PHONE: [phone]    Saulo Lemons  4/19/1932      SUBJECTIVE:   Saulo Lemons is a 89 y.o. male seen for a follow up visit regarding the following:     Chief Complaint   Patient presents with    Hypertension       HPI:    Patient presents for follow-up.  Last seen in November.  Multiple issues were addressed at today's visit:    AVR/mitral valve repair: Last echo in October shows stable mild to moderate prosthetic aortic valve dysfunction and moderate mitral regurgitation.  Patient denies any exertional chest pain, dyspnea or syncope.    Hyperlipidemia:  Lipid panel from November.  Showed total cholesterol 152, LDL 75, HDL 59 and triglyceride 98.  Creatinine was 1.2.  Hemoglobin 12.4    Hypertension: Blood pressure well-controlled.  Tolerating Diovan.    Exercise:  Playing golf once a week.  Has joined a sports club.  Did a 3-month program and now is in a maintenance program twice a week.  Has noted improved strength.        Past Medical History, Past Surgical History, Family history, Social History, and Medications were all reviewed with the patient today and updated as necessary.           Current Outpatient Medications:     valsartan (DIOVAN) 80 MG tablet, Take 1 tablet by mouth daily, Disp: 90 tablet, Rfl: 3    Cholecalciferol (VITAMIN D3) 50 MCG (2000 UT) CAPS, Take 2 capsules by mouth, Disp: , Rfl:     levothyroxine (SYNTHROID) 125 MCG tablet, Take 1 tablet by mouth every morning (before breakfast) (Patient taking differently: Take 100 mcg by mouth every morning (before breakfast)), Disp: 90 tablet, Rfl: 3    rosuvastatin (CRESTOR) 10 MG tablet, Take 1 tablet by mouth daily, Disp: 90 tablet, Rfl: 3    omeprazole (PRILOSEC) 40 MG delayed release capsule, Take 1 capsule by mouth daily, Disp: , Rfl:      No Known Allergies     Patient Active Problem List    Diagnosis Date Noted    Primary hypertension 05/03/2023     Priority:

## 2024-05-14 RX ORDER — VALSARTAN AND HYDROCHLOROTHIAZIDE 80; 12.5 MG/1; MG/1
1 TABLET, FILM COATED ORAL DAILY
Qty: 1 TABLET | Refills: 0
Start: 2024-05-14

## 2024-05-14 NOTE — TELEPHONE ENCOUNTER
Patient called with Dr. Cox's response, medication list updated//brendab  Continue valsartan/HCTZ if that is what he is taking.  Just update medications.  Thank you

## 2024-05-19 ENCOUNTER — TELEPHONE (OUTPATIENT)
Dept: CARDIOLOGY | Age: 89
End: 2024-05-19

## 2024-05-20 ENCOUNTER — TELEPHONE (OUTPATIENT)
Age: 89
End: 2024-05-20

## 2024-05-20 DIAGNOSIS — I49.9 IRREGULAR HEART BEAT: Primary | ICD-10-CM

## 2024-05-20 NOTE — TELEPHONE ENCOUNTER
Not sure how accurate his watch is.  Lets place a 3-day Holter.  Will call him with these results   Patient notified if Dr. Cox's response. Patient voiced understanding. Will come in tomorrow for 3 day holter/brendab  Order placed and scheduled appointment

## 2024-05-20 NOTE — TELEPHONE ENCOUNTER
Heart rate is fluctuation 41 to 86 just sitting still.  Wants someone to call him as he is not sure what to do as he is not having any other symptoms.

## 2024-05-20 NOTE — TELEPHONE ENCOUNTER
Patient called stating that  last night he was watching a talk show on TV got notification on Iphone that his heart rate was less than 45 for about 10 minutes. 30 minutes later, the same notification. Had already gone to bed by the time the on call doctor called, so he missed the call.   Today, around 2 pm he was watching TV again, checked his heart rate and it was 64-91 blood pressure 133/91  Hydrating with about 60 fluid oz of water daily. Only taking valsartan/hctz 80-12.5 mg.   Patient states that he is concerned that his heart rate is fluctuating that much.  Would like to know what he should do or if he should be concerned//nabeel    Last office visit 5-13-24  Last echo 10-24-23

## 2024-05-31 ENCOUNTER — TELEPHONE (OUTPATIENT)
Age: 89
End: 2024-05-31

## 2024-05-31 NOTE — TELEPHONE ENCOUNTER
Patient called with results. Patient voiced understanding//luigiab    ----- Message from Naun Cox MD sent at 5/31/2024 12:36 PM EDT -----  Please call patient.  His monitor shows that his minimum heart rate is never below 55.  Suspect his watch was inaccurate.  His average heart rate is 67.  No significant arrhythmias noted.  Thank you

## 2024-11-20 ENCOUNTER — OFFICE VISIT (OUTPATIENT)
Age: 89
End: 2024-11-20
Payer: MEDICARE

## 2024-11-20 VITALS
SYSTOLIC BLOOD PRESSURE: 154 MMHG | HEIGHT: 71 IN | HEART RATE: 72 BPM | DIASTOLIC BLOOD PRESSURE: 88 MMHG | BODY MASS INDEX: 25.9 KG/M2 | WEIGHT: 185 LBS

## 2024-11-20 DIAGNOSIS — Z98.890 S/P MVR (MITRAL VALVE REPAIR): ICD-10-CM

## 2024-11-20 DIAGNOSIS — Z95.2 S/P AVR (AORTIC VALVE REPLACEMENT): ICD-10-CM

## 2024-11-20 DIAGNOSIS — I10 PRIMARY HYPERTENSION: Primary | ICD-10-CM

## 2024-11-20 DIAGNOSIS — E78.00 HYPERCHOLESTEROLEMIA: ICD-10-CM

## 2024-11-20 PROCEDURE — G8427 DOCREV CUR MEDS BY ELIG CLIN: HCPCS | Performed by: INTERNAL MEDICINE

## 2024-11-20 PROCEDURE — G8417 CALC BMI ABV UP PARAM F/U: HCPCS | Performed by: INTERNAL MEDICINE

## 2024-11-20 PROCEDURE — 1123F ACP DISCUSS/DSCN MKR DOCD: CPT | Performed by: INTERNAL MEDICINE

## 2024-11-20 PROCEDURE — 99214 OFFICE O/P EST MOD 30 MIN: CPT | Performed by: INTERNAL MEDICINE

## 2024-11-20 PROCEDURE — 1036F TOBACCO NON-USER: CPT | Performed by: INTERNAL MEDICINE

## 2024-11-20 PROCEDURE — G8484 FLU IMMUNIZE NO ADMIN: HCPCS | Performed by: INTERNAL MEDICINE

## 2024-11-20 PROCEDURE — 1159F MED LIST DOCD IN RCRD: CPT | Performed by: INTERNAL MEDICINE

## 2024-11-20 PROCEDURE — 1126F AMNT PAIN NOTED NONE PRSNT: CPT | Performed by: INTERNAL MEDICINE

## 2024-11-20 RX ORDER — LEVOTHYROXINE SODIUM 100 UG/1
100 TABLET ORAL DAILY
COMMUNITY

## 2024-11-20 ASSESSMENT — ENCOUNTER SYMPTOMS: SHORTNESS OF BREATH: 0

## 2024-11-20 NOTE — PROGRESS NOTES
[facility] CARDIOLOGY, [address], [address]  [place] [address]  PHONE: [phone]    Saulo Lemons  4/19/1932      SUBJECTIVE:   Saulo Lemons is a 89 y.o. male seen for a follow up visit regarding the following:     Chief Complaint   Patient presents with    Hypertension       HPI:    Saulo Lemons presents for routine follow up. Multiple issues addressed.     AVR with mitral valve repair.  Status:  No symptoms.  He stays active.  He plays golf once a week and goes to sports club.  No exertional chest pain, limiting dyspnea or syncope.  Medications:  Not taking ASA due to bruising.   Prior History:     Bioprosthetic valve 2005  23 mm  Edward bovine pericardial valve and MV repair  2.  Echo (6/27/22):  EF 50-55%.  AVR:  MG 30.  PG 50.  Peak velocity 3.5 m/s. MV repair:  MG 4.  Mild MR.  Mild/moderate TR.  RVSP 32.    3.  Echo (5/10/2023): EF 63%.  + DD.  AVR: MG 38.  PG 60.  Peak gradient 3.9.  DI 0.29  4.  Echo (10/23/2023): EF 55-60%. +DD/  AVR:  MG 29.  PG 47.  Moderate MR.  Mild/moderate TR.  RVSP 32.    5.  Echo (11/13/2024): EF 51%.  Severe septal thickening.  No LVOT obstruction.  AVR: MG 22.  Mild MR.  Moderate TR.  RVSP 39.    Hypertension  Status:  Ambulatory BP readings have been controlled.  Patient reports compliance with medical therapy without side effects.    Medications: Diovan-HCT 80/12.5 mg QD.    Labs:  Had labs by PCP yesterday.     Hyperlipidemia  Status:  Cholesterol panel  followed by PCP and patient reports lipids under acceptable control.  Tolerating current statin dose without side effects. .  Medications: Crestor 10 mg QD.    Labs: Labs yesterday by PCP.        Past Medical History, Past Surgical History, Family history, Social History, and Medications were all reviewed with the patient today and updated as necessary.           Current Outpatient Medications:     levothyroxine (SYNTHROID) 100 MCG tablet, Take 1 tablet by mouth Daily, Disp: , Rfl:

## 2025-05-30 ENCOUNTER — OFFICE VISIT (OUTPATIENT)
Age: 89
End: 2025-05-30
Payer: MEDICARE

## 2025-05-30 VITALS
BODY MASS INDEX: 26.38 KG/M2 | HEIGHT: 71 IN | WEIGHT: 188.4 LBS | DIASTOLIC BLOOD PRESSURE: 68 MMHG | SYSTOLIC BLOOD PRESSURE: 120 MMHG | HEART RATE: 63 BPM

## 2025-05-30 DIAGNOSIS — Z95.2 S/P AVR (AORTIC VALVE REPLACEMENT): ICD-10-CM

## 2025-05-30 DIAGNOSIS — Z98.890 S/P MVR (MITRAL VALVE REPAIR): ICD-10-CM

## 2025-05-30 DIAGNOSIS — I10 PRIMARY HYPERTENSION: Primary | ICD-10-CM

## 2025-05-30 DIAGNOSIS — E78.00 HYPERCHOLESTEROLEMIA: ICD-10-CM

## 2025-05-30 PROCEDURE — 1126F AMNT PAIN NOTED NONE PRSNT: CPT | Performed by: INTERNAL MEDICINE

## 2025-05-30 PROCEDURE — 99214 OFFICE O/P EST MOD 30 MIN: CPT | Performed by: INTERNAL MEDICINE

## 2025-05-30 PROCEDURE — G8417 CALC BMI ABV UP PARAM F/U: HCPCS | Performed by: INTERNAL MEDICINE

## 2025-05-30 PROCEDURE — 1123F ACP DISCUSS/DSCN MKR DOCD: CPT | Performed by: INTERNAL MEDICINE

## 2025-05-30 PROCEDURE — 93000 ELECTROCARDIOGRAM COMPLETE: CPT | Performed by: INTERNAL MEDICINE

## 2025-05-30 PROCEDURE — G8427 DOCREV CUR MEDS BY ELIG CLIN: HCPCS | Performed by: INTERNAL MEDICINE

## 2025-05-30 PROCEDURE — 1159F MED LIST DOCD IN RCRD: CPT | Performed by: INTERNAL MEDICINE

## 2025-05-30 PROCEDURE — 1036F TOBACCO NON-USER: CPT | Performed by: INTERNAL MEDICINE

## 2025-05-30 ASSESSMENT — ENCOUNTER SYMPTOMS: SHORTNESS OF BREATH: 0

## 2025-05-30 NOTE — PROGRESS NOTES
[facility] CARDIOLOGY, [address], [address]  [place] [address]  PHONE: [phone]    Saulo Lemons  4/19/1932      SUBJECTIVE:   Saulo Lemons is a 89 y.o. male seen for a follow up visit regarding the following:     Chief Complaint   Patient presents with    Hypertension       HPI:    Saulo Lemons presents for routine follow up. Multiple issues addressed.     AVR with mitral valve repair.  Status:  No symptoms.  He stays active.  He plays golf once a week and goes to sports club and walks on treadmill at home.  No exertional chest pain, limiting dyspnea or syncope.  Medications:  Aspirin 81 mg three times a week.   Prior History:     Bioprosthetic valve 2005  23 mm  Edward bovine pericardial valve and MV repair  2.  Echo (6/27/22):  EF 50-55%.  AVR:  MG 30.  PG 50.  Peak velocity 3.5 m/s. MV repair:  MG 4.  Mild MR.  Mild/moderate TR.  RVSP 32.    3.  Echo (5/10/2023): EF 63%.  + DD.  AVR: MG 38.  PG 60.  Peak gradient 3.9.  DI 0.29  4.  Echo (10/23/2023): EF 55-60%. +DD/  AVR:  MG 29.  PG 47.  Moderate MR.  Mild/moderate TR.  RVSP 32.    5.  Echo (11/13/2024): EF 51%.  Severe septal thickening.  No LVOT obstruction.  AVR: MG 22.  Mild MR.  Moderate TR.  RVSP 39.    Hypertension  Status:  Ambulatory BP readings have been controlled.  Patient reports compliance with medical therapy without side effects.    Medications: Diovan-HCT 80/12.5 mg QD.    Labs:  Had labs by PCP yesterday.     Hyperlipidemia  Status:  Cholesterol panel  followed by PCP and patient reports lipids under acceptable control.  Tolerating current statin dose without side effects.  Had labs last week.   Medications: Crestor 10 mg QD.        EKG done today and reviewed with patient showed:  Sinus rhythm   -occasional ectopic ventricular beat    -Nonspecific QRS widening.  Rate 63      Past Medical History, Past Surgical History, Family history, Social History, and Medications were all reviewed with the patient today and

## (undated) DEVICE — SUTURE PERMAHAND SZ 2-0 L12X18IN NONABSORBABLE BLK SILK A185H

## (undated) DEVICE — SHEET, T, LAPAROTOMY, STERILE: Brand: MEDLINE

## (undated) DEVICE — DECANTER BAG 9": Brand: MEDLINE INDUSTRIES, INC.

## (undated) DEVICE — STRIP,CLOSURE,WOUND,MEDI-STRIP,1/2X4: Brand: MEDLINE

## (undated) DEVICE — BUTTON SWITCH PENCIL BLADE ELECTRODE, HOLSTER: Brand: EDGE

## (undated) DEVICE — SOLUTION IV 1000ML 0.9% SOD CHL

## (undated) DEVICE — DRAIN SURG PENROSE 0.25X12 IN CLOSED WND DRAINAGE PREM SIL

## (undated) DEVICE — PREP SKN CHLRAPRP APL 26ML STR --

## (undated) DEVICE — PAD,ABDOMINAL,5"X9",ST,LF,25/BX: Brand: MEDLINE INDUSTRIES, INC.

## (undated) DEVICE — 3M™ TEGADERM™ TRANSPARENT FILM DRESSING FRAME STYLE, 1624W, 2-3/8 IN X 2-3/4 IN (6 CM X 7 CM), 100/CT 4CT/CASE: Brand: 3M™ TEGADERM™

## (undated) DEVICE — SUT PROL 2-0 30IN CT2 BLU --

## (undated) DEVICE — SPONGE: SPECIALTY PEANUT XR 100/CS: Brand: MEDICAL ACTION INDUSTRIES

## (undated) DEVICE — MINOR SPLIT GENERAL: Brand: MEDLINE INDUSTRIES, INC.

## (undated) DEVICE — PAD,NON-ADHERENT,3X8,STERILE,LF,1/PK: Brand: MEDLINE

## (undated) DEVICE — SUTURE VCRL SZ 4-0 L18IN ABSRB UD L19MM PS-2 3/8 CIR PRIM J496H

## (undated) DEVICE — NEEDLE HYPO 21GA L1.5IN INTRAMUSCULAR S STL LATCH BVL UP

## (undated) DEVICE — REM POLYHESIVE ADULT PATIENT RETURN ELECTRODE: Brand: VALLEYLAB

## (undated) DEVICE — SUTURE COAT VCRL SZ 4-0 L18IN ABSRB UD L19MM PS-2 1/2 CIR J496G

## (undated) DEVICE — SOLUTION IRRIG 1000ML 0.9% SOD CHL USP POUR PLAS BTL

## (undated) DEVICE — PENROSE DRAIN 12" X 1/4: Brand: CARDINAL HEALTH

## (undated) DEVICE — MASTISOL ADHESIVE LIQ 2/3ML

## (undated) DEVICE — SUTURE PROL SZ 2-0 L30IN NONABSORBABLE BLU L26MM CT-2 1/2 8411H

## (undated) DEVICE — GLOVE SURG SZ 75 CRM LTX FREE POLYISOPRENE POLYMER BEAD ANTI

## (undated) DEVICE — SYR LR LCK 1ML GRAD NSAF 30ML --

## (undated) DEVICE — GARMENT,MEDLINE,DVT,INT,CALF,MED, GEN2: Brand: MEDLINE

## (undated) DEVICE — SUTURE VCRL SZ 3-0 L27IN ABSRB UD L26MM CT-2 1/2 CIR J232H

## (undated) DEVICE — SUTURE VCRL SZ 2-0 L27IN ABSRB UD L26MM CT-2 1/2 CIR J269H

## (undated) DEVICE — TUBING, SUCTION, 3/16" X 10', STRAIGHT: Brand: MEDLINE

## (undated) DEVICE — 3M™ TEGADERM™ TRANSPARENT FILM DRESSING FRAME STYLE, 1626W, 4 IN X 4-3/4 IN (10 CM X 12 CM), 50/CT 4CT/CASE: Brand: 3M™ TEGADERM™